# Patient Record
Sex: MALE | Race: WHITE | Employment: FULL TIME | URBAN - NONMETROPOLITAN AREA
[De-identification: names, ages, dates, MRNs, and addresses within clinical notes are randomized per-mention and may not be internally consistent; named-entity substitution may affect disease eponyms.]

---
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
## 2021-01-25 ENCOUNTER — OFFICE VISIT (OUTPATIENT)
Dept: OPTOMETRY | Age: 21
End: 2021-01-25
Payer: COMMERCIAL

## 2021-01-25 DIAGNOSIS — H40.1131 CHRONIC OPEN ANGLE GLAUCOMA OF BOTH EYES, MILD STAGE: Primary | ICD-10-CM

## 2021-01-25 PROCEDURE — 99213 OFFICE O/P EST LOW 20 MIN: CPT | Performed by: OPTOMETRIST

## 2021-01-25 ASSESSMENT — TONOMETRY
IOP_METHOD: TONOPEN
OD_IOP_MMHG: 25
OS_IOP_MMHG: 23

## 2021-01-25 ASSESSMENT — ENCOUNTER SYMPTOMS
EYES NEGATIVE: 0
RESPIRATORY NEGATIVE: 0
GASTROINTESTINAL NEGATIVE: 0
ALLERGIC/IMMUNOLOGIC NEGATIVE: 0

## 2021-01-25 ASSESSMENT — VISUAL ACUITY
OD_CC+: -2
METHOD: SNELLEN - LINEAR
OS_CC: 20/20
CORRECTION_TYPE: GLASSES
OS_CC+: -2

## 2021-01-25 ASSESSMENT — SLIT LAMP EXAM - LIDS
COMMENTS: NORMAL
COMMENTS: NORMAL

## 2021-01-25 ASSESSMENT — PACHYMETRY
OD_CT(UM): 565
OS_CT(UM): 570

## 2021-01-25 NOTE — PATIENT INSTRUCTIONS
Continue Travaprost at night in both eyes every night.     We will continue to watch the pressures and do repeat studies to make sure there is no progression 1875

## 2021-01-25 NOTE — PROGRESS NOTES
Petty Foote presents today for   Chief Complaint   Patient presents with    1 Month Follow-Up   . HPI     1 month iop check   Travaprost ou nightly          Current Outpatient Medications   Medication Sig Dispense Refill    Travoprost, KEMAR Free, (TRAVATAN Z) 0.004 % SOLN ophthalmic solution Place 1 drop into both eyes nightly 1 Bottle 3     No current facility-administered medications for this visit.           Family History   Problem Relation Age of Onset    High Blood Pressure Mother     High Cholesterol Mother     Allergies Father     Heart Disease Paternal Grandfather     Heart Disease Maternal Grandmother     Hypertension Maternal Grandmother     Cervical Cancer Maternal Grandmother     High Cholesterol Maternal Grandmother     Glaucoma Neg Hx     Diabetes Neg Hx     Cataracts Neg Hx      Social History     Socioeconomic History    Marital status: Single     Spouse name: None    Number of children: None    Years of education: None    Highest education level: None   Occupational History    None   Social Needs    Financial resource strain: None    Food insecurity     Worry: None     Inability: None    Transportation needs     Medical: None     Non-medical: None   Tobacco Use    Smoking status: Passive Smoke Exposure - Never Smoker    Smokeless tobacco: Never Used   Substance and Sexual Activity    Alcohol use: No    Drug use: No    Sexual activity: None   Lifestyle    Physical activity     Days per week: None     Minutes per session: None    Stress: None   Relationships    Social connections     Talks on phone: None     Gets together: None     Attends Jehovah's witness service: None     Active member of club or organization: None     Attends meetings of clubs or organizations: None     Relationship status: None    Intimate partner violence     Fear of current or ex partner: None     Emotionally abused: None     Physically abused: None     Forced sexual activity: None Other Topics Concern    None   Social History Narrative    None     Past Medical History:   Diagnosis Date    Aortic stenosis     congenital     Myopia with astigmatism     Seasonal allergies        ROS     Negative for: Constitutional, Gastrointestinal, Neurological, Skin, Genitourinary, Musculoskeletal, HENT, Endocrine, Cardiovascular, Eyes, Respiratory, Psychiatric, Allergic/Imm, Heme/Lymph          Main Ophthalmology Exam     External Exam       Right Left    External Normal Normal          Slit Lamp Exam       Right Left    Lids/Lashes Normal Normal    Conjunctiva/Sclera White and quiet White and quiet    Cornea Clear Clear    Anterior Chamber 4+ VH  4+ VH     Iris Round and reactive Round and reactive    Lens Clear Clear    Vitreous Normal Normal                   Tonometry     Tonometry (Tonopen, 10:42 AM)       Right Left    Pressure 25 23               Not recorded         Not recorded          Visual Acuity (Snellen - Linear)       Right Left    Dist cc 20/20 -2 20/20 -2    Correction: Glasses          Pupils     Pupils       Pupils    Right PERRL    Left PERRL              Neuro/Psych     Neuro/Psych     Oriented x3: Yes    Mood/Affect: Normal               Not recorded             Not recorded         Not recorded               1. Chronic open angle glaucoma of both eyes, mild stage           Patient Instructions   Continue Travaprost at night in both eyes every night. We will continue to watch the pressures and do repeat studies to make sure there is no progression      Return in about 3 months (around 4/25/2021) for iop check (tonopen and nct) .

## 2021-01-27 ENCOUNTER — TELEPHONE (OUTPATIENT)
Dept: OPTOMETRY | Age: 21
End: 2021-01-27

## 2021-01-27 NOTE — TELEPHONE ENCOUNTER
Patient needs a different medication called in to [facility] Pharmacy stated they did not have any Travoprost in stock. Travoprost, KEMAR Free, (TRAVATAN Z) 0.004 % SOLN ophthalmic solution [[id number]]     Order Details  Dose: 1 drop Route:  Both Eyes Frequency: NIGHTLY   Dispense Quantity: 1 Bottle Refills: 3          Sig: Place 1 drop into both eyes nightly

## 2021-04-19 ENCOUNTER — OFFICE VISIT (OUTPATIENT)
Dept: OPTOMETRY | Age: 21
End: 2021-04-19
Payer: COMMERCIAL

## 2021-04-19 DIAGNOSIS — H40.1131 PRIMARY OPEN ANGLE GLAUCOMA (POAG) OF BOTH EYES, MILD STAGE: Primary | ICD-10-CM

## 2021-04-19 PROCEDURE — 92133 CPTRZD OPH DX IMG PST SGM ON: CPT | Performed by: OPTOMETRIST

## 2021-04-19 PROCEDURE — 99213 OFFICE O/P EST LOW 20 MIN: CPT | Performed by: OPTOMETRIST

## 2021-04-19 ASSESSMENT — VISUAL ACUITY
METHOD: SNELLEN - LINEAR
OS_CC: 20/25
CORRECTION_TYPE: GLASSES

## 2021-04-19 ASSESSMENT — REFRACTION_WEARINGRX
OS_AXIS: 160
OS_CYLINDER: -0.50
SPECS_TYPE: SVL
OD_CYLINDER: -0.50
OD_SPHERE: -2.50
OD_AXIS: 117
OS_SPHERE: -2.00

## 2021-04-19 ASSESSMENT — PACHYMETRY
OS_CT(UM): 570
OD_CT(UM): 565

## 2021-04-19 ASSESSMENT — ENCOUNTER SYMPTOMS
RESPIRATORY NEGATIVE: 0
ALLERGIC/IMMUNOLOGIC NEGATIVE: 0
GASTROINTESTINAL NEGATIVE: 0
EYES NEGATIVE: 0

## 2021-04-19 ASSESSMENT — TONOMETRY
OS_IOP_MMHG: 22
IOP_METHOD: TONOPEN
OD_IOP_MMHG: 20

## 2021-04-19 ASSESSMENT — SLIT LAMP EXAM - LIDS
COMMENTS: NORMAL
COMMENTS: NORMAL

## 2021-04-19 ASSESSMENT — CONF VISUAL FIELD: METHOD: COUNTING FINGERS

## 2021-04-19 NOTE — PATIENT INSTRUCTIONS
Continue lumigan at night every night     Make sure to use the drop every night    We will retest in 6 months to make sure of no progression   And that this pressure is low enough.

## 2021-04-19 NOTE — PROGRESS NOTES
Princess Leal presents today for   Chief Complaint   Patient presents with    3 Month Follow-Up    Glaucoma   . HPI     Glaucoma     In both eyes. Comments     3 month follow up - glaucoma   Medication as changed to lumigan because of cost of travatan                Current Outpatient Medications   Medication Sig Dispense Refill    bimatoprost (LUMIGAN) 0.01 % SOLN ophthalmic drops Place 1 drop into both eyes nightly 1 Bottle 5     No current facility-administered medications for this visit.           Family History   Problem Relation Age of Onset    High Blood Pressure Mother     High Cholesterol Mother     Allergies Father     Heart Disease Paternal Grandfather     Heart Disease Maternal Grandmother     Hypertension Maternal Grandmother     Cervical Cancer Maternal Grandmother     High Cholesterol Maternal Grandmother     Glaucoma Neg Hx     Diabetes Neg Hx     Cataracts Neg Hx      Social History     Socioeconomic History    Marital status: Single     Spouse name: None    Number of children: None    Years of education: None    Highest education level: None   Occupational History    None   Social Needs    Financial resource strain: None    Food insecurity     Worry: None     Inability: None    Transportation needs     Medical: None     Non-medical: None   Tobacco Use    Smoking status: Passive Smoke Exposure - Never Smoker    Smokeless tobacco: Never Used   Substance and Sexual Activity    Alcohol use: No    Drug use: No    Sexual activity: None   Lifestyle    Physical activity     Days per week: None     Minutes per session: None    Stress: None   Relationships    Social connections     Talks on phone: None     Gets together: None     Attends Yarsani service: None     Active member of club or organization: None     Attends meetings of clubs or organizations: None     Relationship status: None    Intimate partner violence     Fear of current or ex partner: None Emotionally abused: None     Physically abused: None     Forced sexual activity: None   Other Topics Concern    None   Social History Narrative    None     Past Medical History:   Diagnosis Date    Aortic stenosis     congenital     Myopia with astigmatism     Seasonal allergies        ROS     Negative for: Constitutional, Gastrointestinal, Neurological, Skin, Genitourinary, Musculoskeletal, HENT, Endocrine, Cardiovascular, Eyes, Respiratory, Psychiatric, Allergic/Imm, Heme/Lymph          Main Ophthalmology Exam     External Exam       Right Left    External Normal Normal          Slit Lamp Exam       Right Left    Lids/Lashes Normal Normal    Conjunctiva/Sclera White and quiet White and quiet    Cornea Clear Clear    Anterior Chamber 4+ VH  4+ VH     Iris Round and reactive Round and reactive    Lens Clear Clear    Vitreous Normal Normal          Fundus Exam       Right Left    Disc Normal Normal    C/D Ratio 0.2 0.2    Macula Normal Normal    Vessels Normal Normal                   Tonometry     Tonometry (Tonopen, 10:41 AM)       Right Left    Pressure 20 22               Not recorded        Confrontational Visual Fields     Visual Fields     Counting fingers                Visual Acuity (Snellen - Linear)       Right Left    Dist cc 20/30 20/25    Correction: Glasses          Pupils     Pupils       Pupils    Right PERRL    Left PERRL              Neuro/Psych     Neuro/Psych     Oriented x3: Yes    Mood/Affect: Normal               Not recorded            Ophthalmology Exam     Wearing Rx       Sphere Cylinder Axis    Right -2.50 -0.50 117    Left -2.00 -0.50 160    Age: 3yrs    Type: SVL               Not recorded           Oct performed today and results discussed     IMPRESSION:  1. Primary open angle glaucoma (POAG) of both eyes, mild stage        PLAN:    1.  Continue treatment with lumigan at night       Patient Instructions   Continue lumigan at night every night     Make sure to use the drop every night    We will retest in 6 months to make sure of no progression   And that this pressure is low enough. Return in about 6 months (around 10/19/2021) for VF, oct ONH, then 1 week complete with dilation .

## 2021-05-18 ENCOUNTER — OFFICE VISIT (OUTPATIENT)
Dept: PRIMARY CARE CLINIC | Age: 21
End: 2021-05-18
Payer: COMMERCIAL

## 2021-05-18 VITALS
HEART RATE: 98 BPM | BODY MASS INDEX: 34.05 KG/M2 | TEMPERATURE: 98.1 F | DIASTOLIC BLOOD PRESSURE: 74 MMHG | SYSTOLIC BLOOD PRESSURE: 128 MMHG | OXYGEN SATURATION: 98 % | WEIGHT: 237.3 LBS

## 2021-05-18 DIAGNOSIS — G43.009 MIGRAINE WITHOUT AURA AND WITHOUT STATUS MIGRAINOSUS, NOT INTRACTABLE: Primary | ICD-10-CM

## 2021-05-18 DIAGNOSIS — M26.621 ARTHRALGIA OF RIGHT TEMPOROMANDIBULAR JOINT: ICD-10-CM

## 2021-05-18 PROCEDURE — 99213 OFFICE O/P EST LOW 20 MIN: CPT | Performed by: FAMILY MEDICINE

## 2021-05-18 ASSESSMENT — ENCOUNTER SYMPTOMS
EYE PAIN: 1
SINUS PAIN: 0
VOMITING: 0
SORE THROAT: 0
SINUS PRESSURE: 0
RHINORRHEA: 0
NAUSEA: 0
PHOTOPHOBIA: 1

## 2021-05-18 ASSESSMENT — PATIENT HEALTH QUESTIONNAIRE - PHQ9
SUM OF ALL RESPONSES TO PHQ QUESTIONS 1-9: 0
SUM OF ALL RESPONSES TO PHQ QUESTIONS 1-9: 0
2. FEELING DOWN, DEPRESSED OR HOPELESS: 0
1. LITTLE INTEREST OR PLEASURE IN DOING THINGS: 0

## 2021-05-18 NOTE — PROGRESS NOTES
2021     Lety Hoyt (:  2000) is a 22 y.o. male, here for evaluation of the following medical concerns:    Migraine   This is a new problem. The current episode started today. The problem occurs constantly. The problem has been gradually improving. The pain is located in the left unilateral region. The pain quality is similar to prior headaches. Associated symptoms include dizziness, ear pain, eye pain, phonophobia and photophobia. Pertinent negatives include no fever, nausea, numbness, rhinorrhea, sinus pressure, sore throat, tingling or vomiting. Associated symptoms comments: Did state that during this episode he did open his jaw and his right TMJ cracked and he had sudden pain in his right ear. Headache was present prior to this episode. Excell Delilah He has tried NSAIDs for the symptoms. The treatment provided moderate (since taking the ibuprofen, his headache has significantly improved.) relief. His past medical history is significant for migraine headaches. Did review patient's med list, allergies, social history,pmhx and pshx today as noted in the record. Review of Systems   Constitutional: Negative for chills, fatigue and fever. HENT: Positive for ear pain. Negative for congestion, postnasal drip, rhinorrhea, sinus pressure, sinus pain and sore throat. Eyes: Positive for photophobia and pain. Gastrointestinal: Negative for nausea and vomiting. Neurological: Positive for dizziness. Negative for tingling and numbness. Prior to Visit Medications    Medication Sig Taking?  Authorizing Provider   bimatoprost (LUMIGAN) 0.01 % SOLN ophthalmic drops Place 1 drop into both eyes nightly Yes Eufemia Robledo OD        Social History     Tobacco Use    Smoking status: Passive Smoke Exposure - Never Smoker    Smokeless tobacco: Never Used   Substance Use Topics    Alcohol use: No        Vitals:    21 1822   BP: 128/74   Site: Left Upper Arm   Position: Sitting   Cuff Size: Large Adult   Pulse: 98   Temp: 98.1 °F (36.7 °C)   TempSrc: Tympanic   SpO2: 98%   Weight: 237 lb 4.8 oz (107.6 kg)     Estimated body mass index is 34.05 kg/m² as calculated from the following:    Height as of 7/6/20: 5' 10\" (1.778 m). Weight as of this encounter: 237 lb 4.8 oz (107.6 kg). Physical Exam  Vitals and nursing note reviewed. Constitutional:       General: He is not in acute distress. Appearance: Normal appearance. He is well-developed. He is not diaphoretic. HENT:      Head: Normocephalic and atraumatic. Right Ear: External ear normal.      Left Ear: External ear normal.      Ears:      Comments: TMs dull with fluid behind the TM     Nose: No congestion or rhinorrhea. Mouth/Throat:      Pharynx: No posterior oropharyngeal erythema. Comments: Post nasal drainage noted  Eyes:      General: No scleral icterus. Right eye: No discharge. Left eye: No discharge. Conjunctiva/sclera: Conjunctivae normal.      Pupils: Pupils are equal, round, and reactive to light. Neck:      Thyroid: No thyromegaly. Cardiovascular:      Rate and Rhythm: Normal rate and regular rhythm. Heart sounds: Normal heart sounds. Pulmonary:      Effort: Pulmonary effort is normal. No respiratory distress. Breath sounds: Normal breath sounds. No wheezing. Musculoskeletal:      Cervical back: Normal range of motion and neck supple. Lymphadenopathy:      Cervical: No cervical adenopathy. Skin:     General: Skin is warm and dry. Findings: No rash. Neurological:      General: No focal deficit present. Mental Status: He is alert and oriented to person, place, and time. Cranial Nerves: No cranial nerve deficit. Psychiatric:         Behavior: Behavior normal.         Thought Content: Thought content normal.         Judgment: Judgment normal.         ASSESSMENT/PLAN:  Encounter Diagnoses   Name Primary?     Migraine without aura and without status migrainosus, not intractable Yes    Arthralgia of right temporomandibular joint        At this point his headache has improved. Reassurance is given. Would monitor for recurrent headaches. If he has frequent recurrence, then would plan to consider prophylactic therapy. Would take ibuprofen as needed for acute headaches. Patient did likely have malpositioning of his mandible which caused the TMJ to crack. Would just continue with NSAIDs to help with residual inflammation and pain. Return  if no improvement in symptoms or if any further symptoms arise. No follow-ups on file. An electronic signature was used to authenticate this note.     --Екатерина Orlando,  on 5/18/2021 at 6:28 PM

## 2021-05-18 NOTE — LETTER
[address] [facility] department of [place] [address]  Phone: [phone]  Fax: [address],       May 18, 2021    Patient:   Santiago Lowry  Date of Birth   2000  Date of visit   5/18/2021        To Whom it May Concern:      Osvaldo Chen was seen in my clinic on 5/18/2021. Please excuse from work 5/18/21 due to acute medical issues. If you have any questions or concerns, please don't hesitate to call.       Sincerely,      Miriam Perkins, DO

## 2021-05-21 ENCOUNTER — TELEPHONE (OUTPATIENT)
Dept: FAMILY MEDICINE CLINIC | Age: 21
End: 2021-05-21

## 2021-05-21 NOTE — TELEPHONE ENCOUNTER
Patient still experiencing headache that you seen him for 5/18/2021. Not as bad but still lingering. You had mentioned something about TMJ to him and mom states he has always had an issue with cracking/popping of the jaw and teeth grinding and wonders what kind of specialist he could see for this. She is aware we will be back in touch on Monday.

## 2021-05-24 NOTE — TELEPHONE ENCOUNTER
Typically I recommend seeing his dentist initially and they sometimes will make a bite guard to help with jaw positioning.

## 2021-06-22 RX ORDER — TRAVOPROST OPHTHALMIC SOLUTION 0.04 MG/ML
1 SOLUTION OPHTHALMIC NIGHTLY
Qty: 3 BOTTLE | Refills: 3 | Status: SHIPPED | OUTPATIENT
Start: 2021-06-22 | End: 2022-06-27

## 2021-06-22 RX ORDER — TRAVOPROST OPHTHALMIC SOLUTION 0.04 MG/ML
SOLUTION OPHTHALMIC
COMMUNITY
Start: 2021-05-15 | End: 2021-06-22 | Stop reason: SDUPTHER

## 2021-10-18 ENCOUNTER — PROCEDURE VISIT (OUTPATIENT)
Dept: OPTOMETRY | Age: 21
End: 2021-10-18
Payer: COMMERCIAL

## 2021-10-18 DIAGNOSIS — H40.1131 CHRONIC OPEN ANGLE GLAUCOMA OF BOTH EYES, MILD STAGE: Primary | ICD-10-CM

## 2021-10-18 PROCEDURE — 92133 CPTRZD OPH DX IMG PST SGM ON: CPT | Performed by: OPTOMETRIST

## 2021-10-18 PROCEDURE — 99999 PR OFFICE/OUTPT VISIT,PROCEDURE ONLY: CPT | Performed by: OPTOMETRIST

## 2021-10-18 PROCEDURE — 92083 EXTENDED VISUAL FIELD XM: CPT | Performed by: OPTOMETRIST

## 2021-10-18 NOTE — PROGRESS NOTES
[address] [place] [address]  Dept: [phone]  Dept Fax: [phone]        [facility] 2000  is here today for HVF 24-2 and OCT ON     Last HVF was completed: 10/15/20  Last OCT was completed: 4/19/21  Last visit: 4/19/21    Patient is scheduled to follow up on 11/01/2021    Patient is currently taking the following eyedrops: Lumigan ou nightly, was switched from travaprost due to insurance coverage. Orders Placed This Encounter   Procedures    Posterior Segment Optic Nerve OCT - OU - Both Eyes    GILL VISUAL FIELD - OU - BOTH EYES         Diagnosis Orders   1. Chronic open angle glaucoma of both eyes, mild stage  Posterior Segment Optic Nerve OCT - OU - Both Eyes    GILL VISUAL FIELD - OU - BOTH EYES        Current Outpatient Medications   Medication Sig Dispense Refill    Travoprost, KEMAR Free, (TRAVATAN Z) 0.004 % SOLN ophthalmic solution Place 1 drop into both eyes nightly 3 Bottle 3     No current facility-administered medications for this visit.

## 2021-10-19 ENCOUNTER — OFFICE VISIT (OUTPATIENT)
Dept: PRIMARY CARE CLINIC | Age: 21
End: 2021-10-19
Payer: COMMERCIAL

## 2021-10-19 VITALS
RESPIRATION RATE: 18 BRPM | TEMPERATURE: 98.3 F | WEIGHT: 241.6 LBS | OXYGEN SATURATION: 100 % | HEIGHT: 70 IN | BODY MASS INDEX: 34.59 KG/M2 | DIASTOLIC BLOOD PRESSURE: 72 MMHG | SYSTOLIC BLOOD PRESSURE: 112 MMHG | HEART RATE: 100 BPM

## 2021-10-19 DIAGNOSIS — M79.10 MYALGIA: ICD-10-CM

## 2021-10-19 DIAGNOSIS — R05.9 COUGH: ICD-10-CM

## 2021-10-19 DIAGNOSIS — J06.9 VIRAL UPPER RESPIRATORY TRACT INFECTION: Primary | ICD-10-CM

## 2021-10-19 DIAGNOSIS — R51.9 NONINTRACTABLE HEADACHE, UNSPECIFIED CHRONICITY PATTERN, UNSPECIFIED HEADACHE TYPE: ICD-10-CM

## 2021-10-19 DIAGNOSIS — R09.81 CONGESTION OF NASAL SINUS: ICD-10-CM

## 2021-10-19 DIAGNOSIS — Z20.822 PERSON UNDER INVESTIGATION FOR COVID-19: ICD-10-CM

## 2021-10-19 DIAGNOSIS — R53.83 FATIGUE, UNSPECIFIED TYPE: ICD-10-CM

## 2021-10-19 PROCEDURE — 99213 OFFICE O/P EST LOW 20 MIN: CPT | Performed by: NURSE PRACTITIONER

## 2021-10-19 ASSESSMENT — ENCOUNTER SYMPTOMS
SORE THROAT: 1
SHORTNESS OF BREATH: 0
NAUSEA: 0
VOMITING: 0
SINUS COMPLAINT: 1
COUGH: 1
DIARRHEA: 0
WHEEZING: 0

## 2021-10-19 NOTE — PATIENT INSTRUCTIONS
Return Wednesday evening/Tursday morning to complete covid test.Will notify you of covid test result as soon as available. You should isolate at home in an area away from family. If you must be around family members, please wear a mask. Quarantine at home until result is available. This means do not go to work/school, attend family gatherings, or invite others to your home until you know your test results. A work/school note will be provided for you. Symptoms appear viral; no antibiotic warranted at this time. The following are measures you can try at home to help provide symptom relief:    --Increase your water intake to help thin secretions and maintain hydration. --May try mucinex (guaifenesin) to help with congestion and robitussin (dextromethorphan) for persistent cough. Use cool mist humidifier at bedtime to moisturize air. Use nasal saline rinse as needed for congestion. --Tylenol or ibuprofen for fever/body aches/headache. Warm/cold packs to forehead and back of neck can help with headache pain. Warm baths/showers can sooth body aches also. Practice good hand hygiene and cover your cough and sneeze to prevent passing virus on. If symptoms worsen or fail to improve, please return to clinic. If you develop severe worsening of symptoms such as chest pain or difficulty breathing, please go to ER. Patient Education        Upper Respiratory Infection (Cold): Care Instructions  Your Care Instructions     An upper respiratory infection, or URI, is an infection of the nose, sinuses, or throat. URIs are spread by coughs, sneezes, and direct contact. The common cold is the most frequent kind of URI. The flu and sinus infections are other kinds of URIs. Almost all URIs are caused by viruses. Antibiotics won't cure them. But you can treat most infections with home care. This may include drinking lots of fluids and taking over-the-counter pain medicine.  You will probably feel better in 4 to 10 days.  The doctor has checked you carefully, but problems can develop later. If you notice any problems or new symptoms, get medical treatment right away. Follow-up care is a key part of your treatment and safety. Be sure to make and go to all appointments, and call your doctor if you are having problems. It's also a good idea to know your test results and keep a list of the medicines you take. How can you care for yourself at home? · To prevent dehydration, drink plenty of fluids. Choose water and other clear liquids until you feel better. If you have kidney, heart, or liver disease and have to limit fluids, talk with your doctor before you increase the amount of fluids you drink. · Take an over-the-counter pain medicine, such as acetaminophen (Tylenol), ibuprofen (Advil, Motrin), or naproxen (Aleve). Read and follow all instructions on the label. · Before you use cough and cold medicines, check the label. These medicines may not be safe for young children or for people with certain health problems. · Be careful when taking over-the-counter cold or flu medicines and Tylenol at the same time. Many of these medicines have acetaminophen, which is Tylenol. Read the labels to make sure that you are not taking more than the recommended dose. Too much acetaminophen (Tylenol) can be harmful. · Get plenty of rest.  · Do not smoke or allow others to smoke around you. If you need help quitting, talk to your doctor about stop-smoking programs and medicines. These can increase your chances of quitting for good. When should you call for help? Call 911 anytime you think you may need emergency care. For example, call if:    · You have severe trouble breathing. Call your doctor now or seek immediate medical care if:    · You seem to be getting much sicker.     · You have new or worse trouble breathing.     · You have a new or higher fever.     · You have a new rash.    Watch closely for changes in your health, and be sure to contact your doctor if:    · You have a new symptom, such as a sore throat, an earache, or sinus pain.     · You cough more deeply or more often, especially if you notice more mucus or a change in the color of your mucus.     · You do not get better as expected. Where can you learn more? Go to https://chpeleslieweb.[email or web address]. org and sign in to your [email or web address] account. Enter J546 in the Freedom2 box to learn more about \"Upper Respiratory Infection (Cold): Care Instructions. \"     If you do not have an account, please click on the \"Sign Up Now\" link. Current as of: July 6, 2021               Content Version: 13.0  © 2006-2021 [employer]. Care instructions adapted under license by [facility] ([facility]). If you have questions about a medical condition or this instruction, always ask your healthcare professional. [address] any warranty or liability for your use of this information.

## 2021-10-19 NOTE — PROGRESS NOTES
[address] [place] [address]  Dept: [phone]  Dept Fax: [phone]  Loc: [phone]        CHIEF COMPLAINT       Chief Complaint   Patient presents with    Chest Congestion     cough, sorethroat        Nurses Notes reviewed and I agree except as noted in the HPI. HISTORY OF PRESENT ILLNESS   Daniel Bajwa is a 22 y.o. male who presents to [facility] today (10/20/2021) for evaluation of:   Sinus Problem  This is a new problem. The current episode started yesterday (last night). The problem has been gradually worsening since onset. There has been no fever. Associated symptoms include congestion, coughing, headaches and a sore throat. Pertinent negatives include no shortness of breath. Treatments tried: dayquil. The treatment provided mild relief. Friends wife was ill, was around her this past weekend. REVIEW OF SYSTEMS     Review of Systems   Constitutional: Positive for fatigue. Negative for fever. HENT: Positive for congestion, postnasal drip and sore throat. Respiratory: Positive for cough. Negative for shortness of breath and wheezing. Gastrointestinal: Negative for diarrhea, nausea and vomiting. Musculoskeletal: Positive for myalgias. Neurological: Positive for headaches. PAST MEDICAL HISTORY         Diagnosis Date    Aortic stenosis     congenital     Myopia with astigmatism     Seasonal allergies        SURGICAL HISTORY     Patient  has a past surgical history that includes Abdomen surgery (2001). CURRENT MEDICATIONS       Outpatient Medications Prior to Visit   Medication Sig Dispense Refill    Travoprost, KEMAR Free, (TRAVATAN Z) 0.004 % SOLN ophthalmic solution Place 1 drop into both eyes nightly 3 Bottle 3     No facility-administered medications prior to visit. ALLERGIES     Patient is has No Known Allergies.     FAMILY HISTORY Patient's family history includes Allergies in his father; Cervical Cancer in his maternal grandmother; Heart Disease in his maternal grandmother and paternal grandfather; High Blood Pressure in his mother; High Cholesterol in his maternal grandmother and mother; Hypertension in his maternal grandmother. SOCIAL HISTORY     Patient  reports that he is a non-smoker but has been exposed to tobacco smoke. He has never used smokeless tobacco. He reports that he does not drink alcohol and does not use drugs. PHYSICAL EXAM     VITALS  BP: 112/72, Temp: 98.3 °F (36.8 °C), Pulse: 100, Resp: 18, SpO2: 100 %  Physical Exam  Vitals reviewed. Constitutional:       General: He is not in acute distress. Appearance: He is not ill-appearing. HENT:      Right Ear: Tympanic membrane and ear canal normal.      Left Ear: Tympanic membrane and ear canal normal.      Nose: Congestion and rhinorrhea present. Rhinorrhea is clear. Mouth/Throat:      Lips: Pink. Mouth: Mucous membranes are moist.      Pharynx: Oropharynx is clear. No oropharyngeal exudate or posterior oropharyngeal erythema. Tonsils: No tonsillar exudate. Cardiovascular:      Rate and Rhythm: Normal rate and regular rhythm. Heart sounds: Normal heart sounds, S1 normal and S2 normal. No murmur heard. Pulmonary:      Effort: Pulmonary effort is normal. No accessory muscle usage or respiratory distress. Breath sounds: Normal breath sounds. No wheezing or rhonchi. Musculoskeletal:      Cervical back: Normal range of motion and neck supple. Lymphadenopathy:      Cervical: No cervical adenopathy. Skin:     General: Skin is warm and dry. Capillary Refill: Capillary refill takes less than 2 seconds. Neurological:      General: No focal deficit present. Mental Status: He is alert. DIAGNOSTIC RESULTS   Labs:No results found for this visit on 10/19/21.     IMAGING:        CLINICAL COURSE:     Vitals:    10/19/21 1512   BP: 112/72   Pulse: 100   Resp: 18   Temp: 98.3 °F (36.8 °C)   SpO2: 100%   Weight: 241 lb 9.6 oz (109.6 kg)   Height: 5' 10\" (1.778 m)           PROCEDURES:  None  FINAL IMPRESSION      1. Viral upper respiratory tract infection    2. Cough    3. Congestion of nasal sinus    4. Fatigue, unspecified type    5. Nonintractable headache, unspecified chronicity pattern, unspecified headache type    6. Myalgia    7. Person under investigation for COVID-19         DISPOSITION/PLAN   Symptoms appear viral at this time. Covid test ordered for pt to complete Wednesday or Thursday as pt's symptoms only began last night. Quarantine guidelines reviewed; will notify as soon as results are available. Discussed supportive measures for symptom relief and educated pt on s/s that warrant return to clinic. Work note provided. Patient Instructions     Return Wednesday evening/Tursday morning to complete covid test.Will notify you of covid test result as soon as available. You should isolate at home in an area away from family. If you must be around family members, please wear a mask. Quarantine at home until result is available. This means do not go to work/school, attend family gatherings, or invite others to your home until you know your test results. A work/school note will be provided for you. Symptoms appear viral; no antibiotic warranted at this time. The following are measures you can try at home to help provide symptom relief:    --Increase your water intake to help thin secretions and maintain hydration. --May try mucinex (guaifenesin) to help with congestion and robitussin (dextromethorphan) for persistent cough. Use cool mist humidifier at bedtime to moisturize air. Use nasal saline rinse as needed for congestion. --Tylenol or ibuprofen for fever/body aches/headache. Warm/cold packs to forehead and back of neck can help with headache pain. Warm baths/showers can sooth body aches also. Practice good hand hygiene and cover your cough and sneeze to prevent passing virus on. If symptoms worsen or fail to improve, please return to clinic. If you develop severe worsening of symptoms such as chest pain or difficulty breathing, please go to ER. Patient Education        Upper Respiratory Infection (Cold): Care Instructions  Your Care Instructions     An upper respiratory infection, or URI, is an infection of the nose, sinuses, or throat. URIs are spread by coughs, sneezes, and direct contact. The common cold is the most frequent kind of URI. The flu and sinus infections are other kinds of URIs. Almost all URIs are caused by viruses. Antibiotics won't cure them. But you can treat most infections with home care. This may include drinking lots of fluids and taking over-the-counter pain medicine. You will probably feel better in 4 to 10 days. The doctor has checked you carefully, but problems can develop later. If you notice any problems or new symptoms, get medical treatment right away. Follow-up care is a key part of your treatment and safety. Be sure to make and go to all appointments, and call your doctor if you are having problems. It's also a good idea to know your test results and keep a list of the medicines you take. How can you care for yourself at home? · To prevent dehydration, drink plenty of fluids. Choose water and other clear liquids until you feel better. If you have kidney, heart, or liver disease and have to limit fluids, talk with your doctor before you increase the amount of fluids you drink. · Take an over-the-counter pain medicine, such as acetaminophen (Tylenol), ibuprofen (Advil, Motrin), or naproxen (Aleve). Read and follow all instructions on the label. · Before you use cough and cold medicines, check the label. These medicines may not be safe for young children or for people with certain health problems.   · Be careful when taking over-the-counter cold or flu medicines and Tylenol at the same time. Many of these medicines have acetaminophen, which is Tylenol. Read the labels to make sure that you are not taking more than the recommended dose. Too much acetaminophen (Tylenol) can be harmful. · Get plenty of rest.  · Do not smoke or allow others to smoke around you. If you need help quitting, talk to your doctor about stop-smoking programs and medicines. These can increase your chances of quitting for good. When should you call for help? Call 911 anytime you think you may need emergency care. For example, call if:    · You have severe trouble breathing. Call your doctor now or seek immediate medical care if:    · You seem to be getting much sicker.     · You have new or worse trouble breathing.     · You have a new or higher fever.     · You have a new rash. Watch closely for changes in your health, and be sure to contact your doctor if:    · You have a new symptom, such as a sore throat, an earache, or sinus pain.     · You cough more deeply or more often, especially if you notice more mucus or a change in the color of your mucus.     · You do not get better as expected. Where can you learn more? Go to https://[employer]. org and sign in to your [employer] account. Enter S057 in the [facility] box to learn more about \"Upper Respiratory Infection (Cold): Care Instructions. \"     If you do not have an account, please click on the \"Sign Up Now\" link. Current as of: July 6, 2021               Content Version: 13.0  © 2006-2021 Healthwise, Incorporated. Care instructions adapted under license by [facility] If you have questions about a medical condition or this instruction, always ask your healthcare professional. [place] [address] any warranty or liability for your use of this information.                    The use, risks, benefits, and potential side effects of prescribed and/or recommended medications were discussed. All questions were answered and the patient/caregiver voiced understanding. Orders Placed This Encounter   Procedures    COVID-19     Standing Status:   Future     Standing Expiration Date:   10/19/2022     Scheduling Instructions:      1) Due to current limited availability of the COVID-19 test, tests will be prioritized based on responses to questions above. Testing may be delayed due to volume. 2) Print and instruct patient to adhere to CDC home isolation program. (Link Above)              3) Set up or refer patient for a monitoring program.              4) Have patient sign up for and leverage MyChart (if not previously done). Order Specific Question:   Is this test for diagnosis or screening? Answer:   Diagnosis of ill patient     Order Specific Question:   Symptomatic for COVID-19 as defined by CDC? Answer:   Yes     Order Specific Question:   Date of Symptom Onset     Answer:   10/18/2021     Order Specific Question:   Hospitalized for COVID-19? Answer:   No     Order Specific Question:   Admitted to ICU for COVID-19? Answer:   No     Order Specific Question:   Employed in healthcare setting? Answer:   No     Order Specific Question:   Resident in a congregate (group) care setting? Answer:   No     Order Specific Question:   Pregnant: Answer:   No     Order Specific Question:   Previously tested for COVID-19? Answer:   No     Outpatient Encounter Medications as of 10/19/2021   Medication Sig Dispense Refill    Travoprost, KEMAR Free, (TRAVATAN Z) 0.004 % SOLN ophthalmic solution Place 1 drop into both eyes nightly 3 Bottle 3     No facility-administered encounter medications on file as of 10/19/2021. No follow-ups on file.                 Electronically signed by RUDY Camacho CNP on 10/20/2021 at 12:14 PM

## 2021-10-19 NOTE — LETTER
[facility] A department of [facility]  Phone: [phone]  Fax: [phone]    RUDY Chance CNP        October 19, 2021     Patient: Mk Omalley   YOB: 2000   Date of Visit: 10/19/2021       To Whom it May Concern:    Elizabeth Flores was seen in my clinic on 10/19/2021. He may return to work after a negative covid test result (results expected in appx 1-3 days) and marked symptom improvement. Pt should be fever free for 24 hours without medication. If you have any questions or concerns, please don't hesitate to call.     Sincerely,         RUDY Chance CNP

## 2021-10-21 ENCOUNTER — HOSPITAL ENCOUNTER (OUTPATIENT)
Age: 21
Setting detail: SPECIMEN
Discharge: HOME | End: 2021-10-21
Payer: COMMERCIAL

## 2021-10-21 DIAGNOSIS — Z20.822 PERSON UNDER INVESTIGATION FOR COVID-19: ICD-10-CM

## 2021-10-21 DIAGNOSIS — R05.9 COUGH: ICD-10-CM

## 2021-10-21 DIAGNOSIS — J06.9 VIRAL UPPER RESPIRATORY TRACT INFECTION: ICD-10-CM

## 2021-10-21 DIAGNOSIS — R53.83 FATIGUE, UNSPECIFIED TYPE: ICD-10-CM

## 2021-10-21 DIAGNOSIS — R09.81 CONGESTION OF NASAL SINUS: ICD-10-CM

## 2021-10-21 DIAGNOSIS — R51.9 NONINTRACTABLE HEADACHE, UNSPECIFIED CHRONICITY PATTERN, UNSPECIFIED HEADACHE TYPE: ICD-10-CM

## 2021-10-21 DIAGNOSIS — M79.10 MYALGIA: ICD-10-CM

## 2021-10-21 PROCEDURE — U0003 INFECTIOUS AGENT DETECTION BY NUCLEIC ACID (DNA OR RNA); SEVERE ACUTE RESPIRATORY SYNDROME CORONAVIRUS 2 (SARS-COV-2) (CORONAVIRUS DISEASE [COVID-19]), AMPLIFIED PROBE TECHNIQUE, MAKING USE OF HIGH THROUGHPUT TECHNOLOGIES AS DESCRIBED BY CMS-2020-01-R: HCPCS

## 2021-10-21 PROCEDURE — U0005 INFEC AGEN DETEC AMPLI PROBE: HCPCS

## 2021-10-22 LAB
SARS-COV-2: NORMAL
SARS-COV-2: NOT DETECTED
SOURCE: NORMAL

## 2021-11-01 ENCOUNTER — OFFICE VISIT (OUTPATIENT)
Dept: OPTOMETRY | Age: 21
End: 2021-11-01
Payer: COMMERCIAL

## 2021-11-01 DIAGNOSIS — H40.1131 PRIMARY OPEN ANGLE GLAUCOMA (POAG) OF BOTH EYES, MILD STAGE: Primary | ICD-10-CM

## 2021-11-01 PROCEDURE — 92250 FUNDUS PHOTOGRAPHY W/I&R: CPT | Performed by: OPTOMETRIST

## 2021-11-01 PROCEDURE — 99214 OFFICE O/P EST MOD 30 MIN: CPT | Performed by: OPTOMETRIST

## 2021-11-01 RX ORDER — TROPICAMIDE 10 MG/ML
1 SOLUTION/ DROPS OPHTHALMIC ONCE
Status: COMPLETED | OUTPATIENT
Start: 2021-11-01 | End: 2021-11-01

## 2021-11-01 RX ADMIN — TROPICAMIDE 1 DROP: 10 SOLUTION/ DROPS OPHTHALMIC at 10:55

## 2021-11-01 ASSESSMENT — SLIT LAMP EXAM - LIDS
COMMENTS: NORMAL
COMMENTS: NORMAL

## 2021-11-01 ASSESSMENT — PACHYMETRY
OS_CT(UM): 570
OD_CT(UM): 565

## 2021-11-01 ASSESSMENT — ENCOUNTER SYMPTOMS
ALLERGIC/IMMUNOLOGIC NEGATIVE: 0
EYES NEGATIVE: 0
GASTROINTESTINAL NEGATIVE: 0
RESPIRATORY NEGATIVE: 0

## 2021-11-01 ASSESSMENT — VISUAL ACUITY
METHOD: SNELLEN - LINEAR
OS_CC+: -1
OS_CC: 20/20
CORRECTION_TYPE: GLASSES

## 2021-11-01 ASSESSMENT — TONOMETRY
IOP_METHOD: TONOPEN
OD_IOP_MMHG: 15
OS_IOP_MMHG: 17

## 2021-11-01 NOTE — PROGRESS NOTES
Yudy Mendez presents today for   Chief Complaint   Patient presents with    2 Week Follow-Up   . HPI     Follow up after HVF: 10/18/2021  travoprost ou at night         Current Outpatient Medications   Medication Sig Dispense Refill    Travoprost, KEMAR Free, (TRAVATAN Z) 0.004 % SOLN ophthalmic solution Place 1 drop into both eyes nightly 3 Bottle 3     No current facility-administered medications for this visit. Family History   Problem Relation Age of Onset    High Blood Pressure Mother     High Cholesterol Mother     Allergies Father     Heart Disease Paternal Grandfather     Heart Disease Maternal Grandmother     Hypertension Maternal Grandmother     Cervical Cancer Maternal Grandmother     High Cholesterol Maternal Grandmother     Glaucoma Neg Hx     Diabetes Neg Hx     Cataracts Neg Hx      Social History     Socioeconomic History    Marital status: Single     Spouse name: None    Number of children: None    Years of education: None    Highest education level: None   Occupational History    None   Tobacco Use    Smoking status: Passive Smoke Exposure - Never Smoker    Smokeless tobacco: Never Used   Substance and Sexual Activity    Alcohol use: No    Drug use: No    Sexual activity: None   Other Topics Concern    None   Social History Narrative    None     Social Determinants of Health     Financial Resource Strain:     Difficulty of Paying Living Expenses:    Food Insecurity:     Worried About Running Out of Food in the Last Year:     Ran Out of Food in the Last Year:    Transportation Needs:     Lack of Transportation (Medical):      Lack of Transportation (Non-Medical):    Physical Activity:     Days of Exercise per Week:     Minutes of Exercise per Session:    Stress:     Feeling of Stress :    Social Connections:     Frequency of Communication with Friends and Family:     Frequency of Social Gatherings with Friends and Family:     Attends [employer] Services:  Active Member of Clubs or Organizations:     Attends Club or Organization Meetings:     Marital Status:    Intimate Partner Violence:     Fear of Current or Ex-Partner:     Emotionally Abused:     Physically Abused:     Sexually Abused:      Past Medical History:   Diagnosis Date    Aortic stenosis     congenital     Myopia with astigmatism     Seasonal allergies        ROS     Negative for: Constitutional, Gastrointestinal, Neurological, Skin, Genitourinary, Musculoskeletal, HENT, Endocrine, Cardiovascular, Eyes, Respiratory, Psychiatric, Allergic/Imm, Heme/Lymph          Main Ophthalmology Exam     External Exam       Right Left    External Normal Normal          Slit Lamp Exam       Right Left    Lids/Lashes Normal Normal    Conjunctiva/Sclera White and quiet White and quiet    Cornea Clear Clear    Anterior Chamber 4+ VH  4+ VH     Iris Round and reactive Round and reactive    Lens Clear Clear    Vitreous Normal Normal          Fundus Exam       Right Left    Disc Normal Normal    C/D Ratio 0.2 0.5    Macula Normal Normal    Vessels Normal Normal                   Tonometry     Tonometry (Tonopen, 10:45 AM)       Right Left    Pressure 15 17               Not recorded         Not recorded          Visual Acuity (Snellen - Linear)       Right Left    Dist cc 20/20 20/20 -1    Correction: Glasses          Pupils     Pupils       Pupils    Right PERRL    Left PERRL              Neuro/Psych     Neuro/Psych     Oriented x3: Yes    Mood/Affect: Normal               Not recorded             Not recorded         Not recorded               Orders Placed This Encounter   Procedures    Color Fundus Photography-OU-Both Eyes         IMPRESSION:  1. Primary open angle glaucoma (POAG) of both eyes, mild stage        PLAN:    1. Please continue travaprost  glaucoma eyedrops as prescribed. Counseled patient that the glaucomas are a group of eye diseases with the potential for severe and irreversible vision loss. Counseled patient regarding the importance of compliance with therapy and follow-up. Counseled patient regarding glaucoma therapy with topical medications and the potential side effects and adverse effects of topical medications. The patient was counseled that glaucoma is a progressive optic neuropathy that, if left untreated, may progress to irreversible blindness. The patient was counseled that the only demonstrated treatment for glaucoma is to lower the pressure in the eye to a target eye pressure which may decrease the rate at which the progressive optic neuropathy leads to irreversible blindness. Patient Instructions   Continue current drops      Return in about 6 months (around 5/1/2022) for iop .

## 2022-01-21 ENCOUNTER — VIRTUAL VISIT (OUTPATIENT)
Dept: FAMILY MEDICINE CLINIC | Age: 22
End: 2022-01-21
Payer: COMMERCIAL

## 2022-01-21 DIAGNOSIS — U07.1 COVID-19: Primary | ICD-10-CM

## 2022-01-21 PROCEDURE — 99213 OFFICE O/P EST LOW 20 MIN: CPT | Performed by: NURSE PRACTITIONER

## 2022-01-21 RX ORDER — ALBUTEROL SULFATE 90 UG/1
2 AEROSOL, METERED RESPIRATORY (INHALATION) 4 TIMES DAILY PRN
Qty: 18 G | Refills: 2 | Status: SHIPPED | OUTPATIENT
Start: 2022-01-21 | End: 2022-09-14

## 2022-01-21 RX ORDER — ACETAMINOPHEN 500 MG
500 TABLET ORAL EVERY 6 HOURS PRN
COMMUNITY

## 2022-01-21 ASSESSMENT — PATIENT HEALTH QUESTIONNAIRE - PHQ9
SUM OF ALL RESPONSES TO PHQ QUESTIONS 1-9: 0
1. LITTLE INTEREST OR PLEASURE IN DOING THINGS: 0
SUM OF ALL RESPONSES TO PHQ QUESTIONS 1-9: 0
2. FEELING DOWN, DEPRESSED OR HOPELESS: 0
SUM OF ALL RESPONSES TO PHQ QUESTIONS 1-9: 0
SUM OF ALL RESPONSES TO PHQ9 QUESTIONS 1 & 2: 0
SUM OF ALL RESPONSES TO PHQ QUESTIONS 1-9: 0

## 2022-01-21 ASSESSMENT — ENCOUNTER SYMPTOMS
SHORTNESS OF BREATH: 1
COUGH: 1

## 2022-01-21 NOTE — PROGRESS NOTES
Subjective:      Patient ID: Edmund Perkins is a 22 y.o. male coming in for   Chief Complaint   Patient presents with    Fever     tested positive for COVID two weeks ago. fever of 103 yesterday. body aches. stuffy nose.  Letter for School/Work     needs a letter for work. email: [email or web address]. [employer]   Edmund Perkins, was evaluated through a synchronous (real-time) audio-video encounter. The patient (or guardian if applicable) is aware that this is a billable service, which includes applicable co-pays. This Virtual Visit was conducted with patient's (and/or legal guardian's) consent. The visit was conducted pursuant to the emergency declaration under the [address] [place] [address] authority and the [employer] Act. Patient identification was verified, and a caregiver was present when appropriate. The patient was located in a state where the provider was licensed to provide care. HPI  Tested positive for covid two weeks ago. Tested positive on 1/9/22. Missed work from 1/10/22-1/15/22. Off work again 1/18/22 due to fever and missed work all this week. Reports still feeling short of breath and body aches. Has been taking tylenol/motrin, dayquil, nyquil, tylenol cold and flu. Review of Systems   Constitutional: Positive for fever. HENT: Positive for congestion. Respiratory: Positive for cough and shortness of breath (worse with activity). Musculoskeletal: Positive for myalgias. All other systems reviewed and are negative. Objective:   Physical Exam  Vitals and nursing note reviewed. Constitutional:       General: He is not in acute distress. Appearance: Normal appearance. Pulmonary:      Effort: Pulmonary effort is normal.   Neurological:      General: No focal deficit present. Mental Status: He is alert and oriented to person, place, and time. Assessment:      1.  COVID-19 Plan:    albuterol as needed for shortness of breath  Continue with otc supportive care  Work note provided per pt request.     No orders of the defined types were placed in this encounter. Outpatient Encounter Medications as of 1/21/2022   Medication Sig Dispense Refill    acetaminophen (TYLENOL) 500 MG tablet Take 500 mg by mouth every 6 hours as needed for Pain      albuterol sulfate HFA (VENTOLIN HFA) 108 (90 Base) MCG/ACT inhaler Inhale 2 puffs into the lungs 4 times daily as needed for Wheezing or Shortness of Breath 18 g 2    Travoprost, BAK Free, (TRAVATAN Z) 0.004 % SOLN ophthalmic solution Place 1 drop into both eyes nightly 3 Bottle 3     No facility-administered encounter medications on file as of 1/21/2022.             RUDY Mccall - CNP

## 2022-01-21 NOTE — LETTER
[facility] A department of [facility]  Phone: [phone]  Fax: [phone]    RUDY Mirza CNP        January 21, 2022     Patient: Luzma العراقي   YOB: 2000   Date of Visit: 1/21/2022       To Whom It May Concern: It is my medical opinion that Vianney Gusman be excused from work 1/18/22-1/23/22 due to covid symptoms. May return to work with no restrictions 1/24/22. If you have any questions or concerns, please don't hesitate to call.     Sincerely,        RUDY Mirza CNP

## 2022-01-24 ENCOUNTER — TELEPHONE (OUTPATIENT)
Dept: FAMILY MEDICINE CLINIC | Age: 22
End: 2022-01-24

## 2022-01-24 NOTE — TELEPHONE ENCOUNTER
----- Message from Maricarmen Kandi sent at 1/24/2022  1:35 PM EST -----  Subject: Message to Provider    QUESTIONS  Information for Provider? Patient parent is calling in to see what can   happen in regards to patient she stated that patient tested positive for   covid two weeks ago but then she stated its like he never got better   ---------------------------------------------------------------------------  --------------  CALL BACK INFO  What is the best way for the office to contact you? OK to leave message on   voicemail  Preferred Call Back Phone Number? [phone]  ---------------------------------------------------------------------------  --------------  SCRIPT ANSWERS  Relationship to Patient? Parent  Representative Name? Garcia Falk  Patient is under 25 and the Parent has custody? Yes  Additional information verified (besides Name and Date of Birth)?  Address

## 2022-01-25 ENCOUNTER — OFFICE VISIT (OUTPATIENT)
Dept: FAMILY MEDICINE CLINIC | Age: 22
End: 2022-01-25
Payer: COMMERCIAL

## 2022-01-25 VITALS
HEIGHT: 70 IN | OXYGEN SATURATION: 97 % | WEIGHT: 235 LBS | RESPIRATION RATE: 16 BRPM | BODY MASS INDEX: 33.64 KG/M2 | TEMPERATURE: 99.1 F | DIASTOLIC BLOOD PRESSURE: 84 MMHG | HEART RATE: 84 BPM | SYSTOLIC BLOOD PRESSURE: 132 MMHG

## 2022-01-25 DIAGNOSIS — G89.29 CHRONIC NONINTRACTABLE HEADACHE, UNSPECIFIED HEADACHE TYPE: ICD-10-CM

## 2022-01-25 DIAGNOSIS — F09 COGNITIVE DISORDER: ICD-10-CM

## 2022-01-25 DIAGNOSIS — U07.1 COVID-19: Primary | ICD-10-CM

## 2022-01-25 DIAGNOSIS — R51.9 CHRONIC NONINTRACTABLE HEADACHE, UNSPECIFIED HEADACHE TYPE: ICD-10-CM

## 2022-01-25 PROCEDURE — 99214 OFFICE O/P EST MOD 30 MIN: CPT | Performed by: FAMILY MEDICINE

## 2022-01-25 RX ORDER — PREDNISONE 20 MG/1
TABLET ORAL
Qty: 15 TABLET | Refills: 0 | Status: SHIPPED | OUTPATIENT
Start: 2022-01-25 | End: 2022-02-23

## 2022-01-25 SDOH — ECONOMIC STABILITY: FOOD INSECURITY: WITHIN THE PAST 12 MONTHS, THE FOOD YOU BOUGHT JUST DIDN'T LAST AND YOU DIDN'T HAVE MONEY TO GET MORE.: NEVER TRUE

## 2022-01-25 SDOH — ECONOMIC STABILITY: FOOD INSECURITY: WITHIN THE PAST 12 MONTHS, YOU WORRIED THAT YOUR FOOD WOULD RUN OUT BEFORE YOU GOT MONEY TO BUY MORE.: NEVER TRUE

## 2022-01-25 ASSESSMENT — PATIENT HEALTH QUESTIONNAIRE - PHQ9
SUM OF ALL RESPONSES TO PHQ QUESTIONS 1-9: 1
SUM OF ALL RESPONSES TO PHQ9 QUESTIONS 1 & 2: 1
SUM OF ALL RESPONSES TO PHQ QUESTIONS 1-9: 1
SUM OF ALL RESPONSES TO PHQ QUESTIONS 1-9: 1
1. LITTLE INTEREST OR PLEASURE IN DOING THINGS: 0
2. FEELING DOWN, DEPRESSED OR HOPELESS: 1
SUM OF ALL RESPONSES TO PHQ QUESTIONS 1-9: 1

## 2022-01-25 ASSESSMENT — ENCOUNTER SYMPTOMS
TROUBLE SWALLOWING: 0
SHORTNESS OF BREATH: 0
VOMITING: 0
SINUS PRESSURE: 0
COUGH: 0
NAUSEA: 0
CONSTIPATION: 0
WHEEZING: 0
DIARRHEA: 0
EYE REDNESS: 0
SORE THROAT: 0
ABDOMINAL PAIN: 0
EYE DISCHARGE: 0
RHINORRHEA: 0

## 2022-01-25 ASSESSMENT — SOCIAL DETERMINANTS OF HEALTH (SDOH): HOW HARD IS IT FOR YOU TO PAY FOR THE VERY BASICS LIKE FOOD, HOUSING, MEDICAL CARE, AND HEATING?: NOT VERY HARD

## 2022-01-25 NOTE — PROGRESS NOTES
2022     Lina Cohn (:  2000) is a 22 y.o. male, here for evaluation of the following medical concerns:    HPI    Patient comes in today secondary to ongoing issues of cognition and headaches. Patient has had cognitive issues since 2018. He just feels like he is very foggy chronically with his thought process. Almost feels like he is \"spacey\" all the time. He has had a known history of headaches as well for quite some time. He did have evidence of glaucoma that was discovered in the last couple years duration and so it is concerning that there could be something intracranial contributing to his overall symptoms. Does note that he was diagnosed recently with Covid 19 and since having acute COVID he feels like his symptoms have even worsened. He states that he does feel like he has no ability to focus or concentrate and feels very lethargic. Has had persistent sinus congestion and drainage and also states his appetite is diminished. Mom is concerned that his symptoms have worsened significantly. Does not feel at this point that he is able to stay on track enough to be able to work. Patient otherwise has no other acute medical concerns at this time. Did review patient's med list, allergies, social history,pmhx and pshx today as noted in the record. Review of Systems   Constitutional: Positive for appetite change and fatigue. Negative for chills and fever. HENT: Negative for congestion, ear pain, postnasal drip, rhinorrhea, sinus pressure, sore throat and trouble swallowing. Eyes: Negative for discharge and redness. Respiratory: Negative for cough, shortness of breath and wheezing. Cardiovascular: Negative for chest pain. Gastrointestinal: Negative for abdominal pain, constipation, diarrhea, nausea and vomiting. Genitourinary: Negative for dysuria, flank pain, frequency and urgency. Musculoskeletal: Negative for arthralgias, myalgias and neck pain.    Skin: Negative for rash and wound. Allergic/Immunologic: Negative for environmental allergies. Neurological: Negative for dizziness, weakness, light-headedness and headaches. Hematological: Negative for adenopathy. Psychiatric/Behavioral: Positive for decreased concentration. Prior to Visit Medications    Medication Sig Taking? Authorizing Provider   acetaminophen (TYLENOL) 500 MG tablet Take 500 mg by mouth every 6 hours as needed for Pain  Historical Provider, MD   albuterol sulfate HFA (VENTOLIN HFA) 108 (90 Base) MCG/ACT inhaler Inhale 2 puffs into the lungs 4 times daily as needed for Wheezing or Shortness of Breath  Judydie Fret, APRN - CNP   Travoprost, BAK Free, (TRAVATAN Z) 0.004 % SOLN ophthalmic solution Place 1 drop into both eyes nightly  Eufemia Duran, OD        Social History     Tobacco Use    Smoking status: Current Some Day Smoker     Packs/day: 0.50     Years: 3.00     Pack years: 1.50     Types: Cigarettes    Smokeless tobacco: Never Used   Substance Use Topics    Alcohol use: No        There were no vitals filed for this visit. Estimated body mass index is 34.67 kg/m² as calculated from the following:    Height as of 10/19/21: 5' 10\" (1.778 m). Weight as of 10/19/21: 241 lb 9.6 oz (109.6 kg). Physical Exam  Vitals and nursing note reviewed. Constitutional:       General: He is not in acute distress. Appearance: Normal appearance. He is well-developed. He is not diaphoretic. HENT:      Head: Normocephalic and atraumatic. Right Ear: Tympanic membrane, ear canal and external ear normal.      Left Ear: Tympanic membrane, ear canal and external ear normal.      Nose: Nose normal.      Mouth/Throat:      Mouth: Mucous membranes are moist.      Pharynx: Oropharynx is clear. No oropharyngeal exudate. Eyes:      General:         Right eye: No discharge. Left eye: No discharge.       Conjunctiva/sclera: Conjunctivae normal.      Pupils: Pupils are equal, round, and reactive to light. Neck:      Thyroid: No thyromegaly. Cardiovascular:      Rate and Rhythm: Normal rate and regular rhythm. Heart sounds: Normal heart sounds. Pulmonary:      Effort: Pulmonary effort is normal.      Breath sounds: Normal breath sounds. No wheezing or rales. Abdominal:      General: Bowel sounds are normal. There is no distension. Palpations: Abdomen is soft. Tenderness: There is no abdominal tenderness. Musculoskeletal:      Cervical back: Normal range of motion and neck supple. Lymphadenopathy:      Cervical: No cervical adenopathy. Skin:     General: Skin is warm and dry. Findings: No rash. Neurological:      Mental Status: He is alert and oriented to person, place, and time. Cranial Nerves: Cranial nerves are intact. Sensory: Sensation is intact. Motor: Motor function is intact. Coordination: Coordination is intact. Gait: Gait is intact. Psychiatric:         Mood and Affect: Affect is flat. Behavior: Behavior normal.         Thought Content: Thought content normal.         Judgment: Judgment normal.         ASSESSMENT/PLAN:    Encounter Diagnoses   Name Primary?  COVID-19 Yes    Chronic nonintractable headache, unspecified headache type     Cognitive disorder      Orders Placed This Encounter   Medications    predniSONE (DELTASONE) 20 MG tablet     Si bid for 5 days, 1 qd for 5 days     Dispense:  15 tablet     Refill:  0     Orders Placed This Encounter   Procedures    MRI BRAIN W WO CONTRAST     Standing Status:   Future     Standing Expiration Date:   2023     Order Specific Question:   Reason for exam:     Answer:   chronic cognitive processing issues with headaches. Glaucoma history     At this point I do think he has some symptoms of Covid long haulers and I do think I will give him a course of oral steroid to see if this will help with acute inflammation.   Did explain to him that this can last anywhere from several days after acute infection to several weeks. Other than getting adequate rest and maintaining hydration there is not really a known treatment for this. Do think that the prednisone will help with any residual respiratory and GI inflammation that could be causing persistent issues. We will get an MRI of the brain due to his chronic cognitive processing issues and headaches as well as glaucoma and visual changes. If this is normal then would pursue neuropsych evaluation for further opinion. Return  if no improvement in symptoms or if any further symptoms arise. (Please note that portions of this note were completed with a voice recognition program. Efforts were made to edit the dictations but occasionally words are mis-transcribed.)      No follow-ups on file. An electronic signature was used to authenticate this note.     --Fatemeh Sun,  on 1/25/2022 at 7:26 AM

## 2022-02-02 ENCOUNTER — HOSPITAL ENCOUNTER (OUTPATIENT)
Dept: MRI IMAGING | Age: 22
Discharge: HOME | End: 2022-02-04

## 2022-02-02 ENCOUNTER — TELEPHONE (OUTPATIENT)
Dept: MRI IMAGING | Age: 22
End: 2022-02-02

## 2022-02-02 DIAGNOSIS — G89.29 CHRONIC NONINTRACTABLE HEADACHE, UNSPECIFIED HEADACHE TYPE: ICD-10-CM

## 2022-02-02 DIAGNOSIS — F09 COGNITIVE DISORDER: ICD-10-CM

## 2022-02-02 DIAGNOSIS — R51.9 CHRONIC NONINTRACTABLE HEADACHE, UNSPECIFIED HEADACHE TYPE: ICD-10-CM

## 2022-02-02 NOTE — TELEPHONE ENCOUNTER
Left patient a voicemail that we could give him some Valium to help relax him for the MRI or we could schedule him an open MRI in [address] [place] [address]. Advised him to call me back with option he would like to try.

## 2022-02-02 NOTE — TELEPHONE ENCOUNTER
Patient returned call and would like to try open MRI in [place]. Advised him we will check with his insurance then [facility] and [facility] will contact him. Verbalizes understanding.

## 2022-02-02 NOTE — TELEPHONE ENCOUNTER
Martha Wray was unable to do his MRI due to claustrophobia. I told him I would let you know and someone from your office would contact him to see what his options are.

## 2022-02-03 ENCOUNTER — TELEPHONE (OUTPATIENT)
Dept: FAMILY MEDICINE CLINIC | Age: 22
End: 2022-02-03

## 2022-02-03 NOTE — TELEPHONE ENCOUNTER
Brian Thomason from [facility], patient brought in [facility] paperwork and she will need a return to work date to pencil in. No follow up appointment made, waiting for patient to be scheduled for open MRI pending insurance review and scheduling at [facility] in [facility]. Please advise on tentative return to work date.

## 2022-02-04 ENCOUNTER — TELEPHONE (OUTPATIENT)
Dept: FAMILY MEDICINE CLINIC | Age: 22
End: 2022-02-04

## 2022-02-04 NOTE — TELEPHONE ENCOUNTER
[facility] regarding the need for PA of open MRI of brain. Patient requesting open MRI at [facility]. Per Letty at [facility] of [address] - NO prior authorization is required for code [address]. Reference # [id number]. Order and insurance information faxed to [facility].

## 2022-02-11 ENCOUNTER — TELEPHONE (OUTPATIENT)
Dept: FAMILY MEDICINE CLINIC | Age: 22
End: 2022-02-11

## 2022-02-11 DIAGNOSIS — F09 COGNITIVE DISORDER: Primary | ICD-10-CM

## 2022-02-11 NOTE — TELEPHONE ENCOUNTER
Patient notified of brain MRI results. Offered neuropsych consult and patient would like to proceed with referral. Referral order placed and information faxed.

## 2022-02-11 NOTE — TELEPHONE ENCOUNTER
we need to notify patient that his MRI did not show any acute concerning findings. Eusebio Olea consider a referral to neuropsych for further assessment of some of his cognitive/attention and focus issues.   --Note from Dr Ne Quijano

## 2022-02-18 ENCOUNTER — TELEPHONE (OUTPATIENT)
Dept: FAMILY MEDICINE CLINIC | Age: 22
End: 2022-02-18

## 2022-02-18 NOTE — TELEPHONE ENCOUNTER
Left message for patient to call back to ask when he is thinking he would be able to return to work?

## 2022-02-18 NOTE — TELEPHONE ENCOUNTER
----- Message from Shen Camacho sent at 2/17/2022  2:45 PM EST -----  Subject: Message to Provider    QUESTIONS  Information for Provider? Pt damien states that FMLA was until last week -   he was not aware of that - he needs a note from Dr extending Hiram Fothergill - has   multiple appts coming up (neuro 4/21 & [facility] issues prior to April) - covid   lingering systems still affecting him; please call pt to advise  ---------------------------------------------------------------------------  --------------  CALL BACK INFO  What is the best way for the office to contact you? OK to leave message on   voicemail  Preferred Call Back Phone Number? [phone]  ---------------------------------------------------------------------------  --------------  SCRIPT ANSWERS  Relationship to Patient?  Self

## 2022-02-18 NOTE — TELEPHONE ENCOUNTER
Patient returning call. States he is unsure of a RTW timeframe. Currently he is able to function about 4-5 hours at a time. He is also trying to coordinate appointments to evaluate issues. Original FMLA was from 1/19/22-2/13/22.

## 2022-02-21 NOTE — TELEPHONE ENCOUNTER
Not sure what to do about his return to work. I don't honestly know that I can keep him off work until his follow up with neurology as this is almost 2 months away and not sure that his symptoms that he is seeing the neurologist for is new onset. What are his current symptoms that he is only able to function for 4-5 hours. Guess I would need more detail about that and probably have to see him back to document what is going on and see if there is anything more that can help him if he is still having symptoms post covid.

## 2022-02-23 ENCOUNTER — OFFICE VISIT (OUTPATIENT)
Dept: FAMILY MEDICINE CLINIC | Age: 22
End: 2022-02-23
Payer: COMMERCIAL

## 2022-02-23 VITALS
SYSTOLIC BLOOD PRESSURE: 130 MMHG | TEMPERATURE: 98.4 F | WEIGHT: 247 LBS | OXYGEN SATURATION: 97 % | BODY MASS INDEX: 35.36 KG/M2 | RESPIRATION RATE: 18 BRPM | HEIGHT: 70 IN | HEART RATE: 104 BPM | DIASTOLIC BLOOD PRESSURE: 80 MMHG

## 2022-02-23 DIAGNOSIS — F41.9 POST-COVID CHRONIC ANXIETY: ICD-10-CM

## 2022-02-23 DIAGNOSIS — U09.9 POST-COVID CHRONIC ANXIETY: ICD-10-CM

## 2022-02-23 DIAGNOSIS — R41.840 POST-COVID CHRONIC CONCENTRATION DEFICIT: Primary | ICD-10-CM

## 2022-02-23 DIAGNOSIS — U09.9 POST-COVID CHRONIC CONCENTRATION DEFICIT: Primary | ICD-10-CM

## 2022-02-23 PROCEDURE — 99213 OFFICE O/P EST LOW 20 MIN: CPT | Performed by: FAMILY MEDICINE

## 2022-02-23 ASSESSMENT — ENCOUNTER SYMPTOMS
GASTROINTESTINAL NEGATIVE: 1
RESPIRATORY NEGATIVE: 1
EYES NEGATIVE: 1

## 2022-02-23 NOTE — PROGRESS NOTES
2022     Santo Escobar (:  2000) is a 22 y.o. male, here for evaluation of the following medical concerns:    HPI  Patient is seen today in follow-up of residual issues related to recent Covid infection. Patient does have continued issues with focus and concentration. Physically he feels fine from his recent Covid infection as he was having a lot of fatigue and weakness but that seems to have improved. He states he just has difficulty staying on track and staying focused. Does still have issues with feeling very anxious. States that he has tried to go out with his friends and just finds that he gets very anxious and needs to go home he is concerned about going back to work as he works in our way he starts feeling that way. Is awaiting neuropsych evaluation to discuss some of his more chronic cognitive issues. Patient otherwise has no other acute medical concerns    Did review patient's med list, allergies, social history, fam history, pmhx and pshx today as noted in the record. Review of Systems   Constitutional: Negative for chills, fatigue and fever. HENT: Negative. Eyes: Negative. Respiratory: Negative. Cardiovascular: Negative. Gastrointestinal: Negative. Psychiatric/Behavioral: Positive for decreased concentration. Negative for agitation, confusion, dysphoric mood and sleep disturbance. The patient is nervous/anxious. Prior to Visit Medications    Medication Sig Taking?  Authorizing Provider   predniSONE (DELTASONE) 20 MG tablet 1 bid for 5 days, 1 qd for 5 days  Sarahi Loyd DO   acetaminophen (TYLENOL) 500 MG tablet Take 500 mg by mouth every 6 hours as needed for Pain  Historical Provider, MD   albuterol sulfate HFA (VENTOLIN HFA) 108 (90 Base) MCG/ACT inhaler Inhale 2 puffs into the lungs 4 times daily as needed for Wheezing or Shortness of Breath  Alice Ulisses, APRN - CNP   Travoprost, BAK Free, (TRAVATAN Z) 0.004 % SOLN ophthalmic solution Place 1 drop into both eyes nightly  Eufemia Coffman Zach, OD        Social History     Tobacco Use    Smoking status: Former Smoker     Packs/day: 0.50     Years: 3.00     Pack years: 1.50     Types: Cigarettes     Start date: 2018     Quit date: 2022     Years since quittin.1    Smokeless tobacco: Never Used   Substance Use Topics    Alcohol use: Yes     Comment: occasionally        There were no vitals filed for this visit. Estimated body mass index is 33.72 kg/m² as calculated from the following:    Height as of 22: 5' 10\" (1.778 m). Weight as of 22: 235 lb (106.6 kg). Physical Exam  Vitals and nursing note reviewed. Constitutional:       General: He is not in acute distress. Appearance: He is well-developed. He is not diaphoretic. HENT:      Head: Normocephalic and atraumatic. Eyes:      Conjunctiva/sclera: Conjunctivae normal.   Pulmonary:      Effort: Pulmonary effort is normal.   Musculoskeletal:      Cervical back: Normal range of motion. Skin:     General: Skin is warm and dry. Coloration: Skin is not pale. Findings: No erythema or rash. Neurological:      Mental Status: He is alert and oriented to person, place, and time. Psychiatric:         Attention and Perception: Attention normal.         Mood and Affect: Mood is anxious. Speech: Speech normal.         Behavior: Behavior normal.         Thought Content: Thought content normal.         Cognition and Memory: Cognition and memory normal.         Judgment: Judgment normal.         ASSESSMENT/PLAN:  Encounter Diagnoses   Name Primary?  Post-COVID chronic concentration deficit Yes    Post-COVID chronic anxiety      At this point I do think that he is having some increased anxiety related to a lot of the stress that happened with being off work from [employer]. Has also noticed the concentration issues. Will give him off work until  and then will give him restricted work week of 40 hours/week. Hopefully he will be able to go back to regular duties without difficulty with maintaining concentration. I do think him getting back into a regular routine would be beneficial for him. Patient is to follow-up with the neuropsychologist as scheduled for further evaluation of his cognitive issues. Patient is to return to my office if continued issues or concerns should arise. (Please note that portions of this note were completed with a voice recognition program. Efforts were made to edit the dictations but occasionally words are mis-transcribed.)        No follow-ups on file. An electronic signature was used to authenticate this note.     --Alpa Mitchell,  on 2/23/2022 at 7:36 AM

## 2022-02-23 NOTE — Clinical Note
[address] of [facility] [place] [address]  Phone: [phone]  Fax: [address] [place],         February 23, 2022     Patient: Mohinder Elena   YOB: 2000   Date of Visit: 2/23/2022       To Whom It May Concern: It is my medical opinion that Ralph Garvin {Work release (duty restriction):31872}. If you have any questions or concerns, please don't hesitate to call.     Sincerely,        Sena Youssef DO

## 2022-02-23 NOTE — LETTER
[address] [facility] of [place] [address]  Phone: [phone]  Fax: Arvin Yu DO      February 23, 2022    Patient:   Mohinder Elena  Date of Birth   2000  Date of visit   2/23/2022        To Whom it May Concern:      Ralph Garvin was seen in my clinic on 2/23/2022. Please excuse from work      If you have any questions or concerns, please don't hesitate to call.       Sincerely,      Sena Youssef DO

## 2022-02-23 NOTE — LETTER
[address] [facility] of [place] [address]  Phone: [phone]  Fax: Arvin Yu,       February 23, 2022    Patient:   Luzma العراقي  Date of Birth   2000  Date of visit   2/23/2022        To Whom it May Concern:      Vianney Gusman was seen in my clinic on 2/23/2022. Please excuse from work 2/13/22-3/6/22 due to acute medical issues. Then he can return on 3/7/22 with restriction of a 40 hour work week. If you have any questions or concerns, please don't hesitate to call.       Sincerely,      Sylvie Ford, DO

## 2022-03-28 ENCOUNTER — OFFICE VISIT (OUTPATIENT)
Dept: PRIMARY CARE CLINIC | Age: 22
End: 2022-03-28
Payer: COMMERCIAL

## 2022-03-28 VITALS
DIASTOLIC BLOOD PRESSURE: 84 MMHG | BODY MASS INDEX: 35.35 KG/M2 | HEART RATE: 106 BPM | OXYGEN SATURATION: 98 % | TEMPERATURE: 98.4 F | WEIGHT: 246.4 LBS | RESPIRATION RATE: 14 BRPM | SYSTOLIC BLOOD PRESSURE: 122 MMHG

## 2022-03-28 DIAGNOSIS — F41.9 ANXIETY: ICD-10-CM

## 2022-03-28 DIAGNOSIS — R41.89 BRAIN FOG: Primary | ICD-10-CM

## 2022-03-28 PROCEDURE — 99212 OFFICE O/P EST SF 10 MIN: CPT | Performed by: NURSE PRACTITIONER

## 2022-03-28 ASSESSMENT — ENCOUNTER SYMPTOMS
CHEST TIGHTNESS: 0
RESPIRATORY NEGATIVE: 1
DIARRHEA: 0
COUGH: 0
WHEEZING: 0
ABDOMINAL PAIN: 0
CONSTIPATION: 0
BACK PAIN: 0
GASTROINTESTINAL NEGATIVE: 1
SHORTNESS OF BREATH: 0

## 2022-03-28 NOTE — PATIENT INSTRUCTIONS
Patient Education        Learning About Generalized Anxiety Disorder  What is generalized anxiety disorder? We all worry. It's a normal part of life. But when you have generalized anxiety disorder, you worry about lots of things and have a hard time stopping your worry. This worry or anxiety interferes with your relationships, work, and life. What causes it? The cause of generalized anxiety disorder is not known. Some studies show that it might be passed down through families. Several things can cause symptoms of anxiety. They include some health problems, some medicines, too much caffeine, and illegal drugs such as cocaine. What are the symptoms? Generalized anxiety disorder can make you feel worried and stressed about many things almost every day. You may have a hard time controlling your worry. Symptoms include:  · Feeling tired or cranky. You may have a hard time concentrating. · Having headaches or muscle aches. · Feeling shaky, sweating, or having hot flashes. · Feeling lightheaded, sick to your stomach, or out of breath. · Feeling like you can't relax. Or being startled easily. · Having problems falling or staying asleep. How is it diagnosed? Your doctor will ask about your health and how often you worry or feel anxious. People with generalized anxiety disorder have more worry and stress than normal. They feel worried and stressed about many things almost every day. And these feelings have lasted for at least 6 months. Your doctor also may ask about other symptoms, like whether you:  · Feel restless. · Feel tired. · Have a hard time thinking or feel that your mind goes blank. · Feel cranky. · Have tense muscles. · Have sleep problems. A physical exam and tests can help make sure that your symptoms aren't caused by a different condition, such as a thyroid problem. How is it treated? Counseling and medicine can both work to treat anxiety.  The two are often used along with lifestyle changes. Cognitive-behavioral therapy (CBT) is a type of counseling that's used to help treat anxiety. In CBT, you learn how to notice and replace thoughts that make you feel worried. It also can help you learn how to relax when you worry. Applied relaxation therapy may also be used. Your counselor might ask you to imagine a calming situation. This can help you relax. Medicines can help. These medicines are often also used for depression. Selective serotonin reuptake inhibitors (SSRIs) are often tried first. But there are other medicines that your doctor may use. You may need to try a few medicines to find one that works well. Many people feel better by getting regular exercise, eating healthy meals, and getting good sleep. Mindfulnessfocusing on things in the present momentalso can help reduce your anxiety. What can you expect when you have it? Having anxiety can be upsetting. Some people might feel less worried and stressed after a couple of months of treatment. But for other people, it might take longer to feel better. Reaching out to people for help is important. Try not to isolate yourself. Let your family and friends help you. Find someone you can trust and confide in. Talk to that person. When you know what anxiety isand how you can get help for ityou can start to learn new ways of thinking. This can help you cope and work through your anxiety. Follow-up care is a key part of your treatment and safety. Be sure to make and go to all appointments, and call your doctor if you are having problems. It's also a good idea to know your test results and keep a list of the medicines you take. Where can you learn more? Go to https://alecia.Azteq Mobile. org and sign in to your [employer] account. Enter G110 in the magnetU box to learn more about \"Learning About Generalized Anxiety Disorder. \"     If you do not have an account, please click on the \"Sign Up Now\" link.   Current as of: June 16, 2021               Content Version: 13.1  © 2006-2021 [employer]. Care instructions adapted under license by [facility] ([facility]). If you have questions about a medical condition or this instruction, always ask your healthcare professional. [address] any warranty or liability for your use of this information. Patient Education        Learning About Chemo Brain  What is chemo brain? Chemo brain is a problem with thinking and memory that can happen during and especially after chemotherapy treatment for cancer. It can make it hard for you to think, concentrate, and do tasks. You may have trouble remembering things. And you may feel like your brain isn't working right. It can be frightening to have this happen, especially during an already stressful time. These problems can be mild. But they can be so serious that people have a hard time working or doing their daily activities. What causes chemo brain? These thinking and memory problems may be caused by chemotherapy medicines used to treat cancer. They could occur because of the cancer itself and maybe because of other medicines used to treat cancer. The anxiety and stress of having cancer also may make it harder to think and remember. What are the symptoms? Symptoms vary depending on the person. But you may:  · Forget events, names, or other things. · Have trouble thinking of certain words when you talk. · Have trouble learning new things. · Take longer to do routine tasks. · Have trouble concentrating or feel like your mind is in a fog. How is chemo brain treated? Chemo brain may go away when treatment ends. If your symptoms are mild, they may go away without treatment. If your symptoms are very bad, your doctor may suggest that you see a specialist who is an expert in thinking and memory problems. What can you do to cope? Take care of yourself  · Try to relax to reduce your stress.  Meditate, or do yoga or another relaxing activity. · Try to be patient with yourself. The problem may go away with time. · Get plenty of sleep. · Eat a healthy diet. · Be as physically active as you can. But check with your doctor to make sure that you don't do too much too soon. · Keep your brain active by reading and doing puzzles, games, or crosswords. Use memory aids  · Use sticky notes and calendars to help you remember events and tasks. · Carry a notebook to write down important dates, to-do lists, and names of people. · Try to have a routine for daily tasks so you get used to doing the same things in the same order every day. · Bring a family member or friend to doctor visits. Or use your phone or another device to record your talk with your doctor. · Keep a diary or journal. Write down when your mind feels the most clear and when you have trouble. Note how much sleep you had, if you were stressed, or other things that happened. Seek support  · Tell your family and close friends about the problem so they know what's going on if you forget words or seem foggy. Suggest ways they can help you. · See an oncology social worker if you are having trouble coping with memory problems. · Think about joining a support group for people in cancer treatment. They may have the same problems. You can share ideas. Follow-up care is a key part of your treatment and safety. Be sure to make and go to all appointments, and call your doctor if you are having problems. It's also a good idea to know your test results and keep a list of the medicines you take. Where can you learn more? Go to https://LightPolehazelBiiCode.Next New Networks. org and sign in to your [employer] account. Enter E865 in the Wink box to learn more about \"Learning About Chemo Brain. \"     If you do not have an account, please click on the \"Sign Up Now\" link.   Current as of: September 8, 2021               Content Version: 13.1  © 4323-2279 [employer]wise,

## 2022-03-28 NOTE — LETTER
[address] [facility] A department of [place] [address]  Phone: [phone]  Fax: [phone]    RUDY Meza CNP        March 28, 2022     Patient: Denis    YOB: 2000   Date of Visit: 3/28/2022       To Whom It May Concern: It is my medical opinion that Zuri Cullen may return to work on 3/29/2022. If you have any questions or concerns, please don't hesitate to call.     Sincerely,        RUDY Meza CNP

## 2022-03-28 NOTE — PROGRESS NOTES
[address] [facility] A department of [facility], [place]  Phone: [phone]  Fax: [phone]      Santo Escobar is a 25 y.o. male who presents to the [facility] today for his medical conditions/complaints as noted below. Santo Escobar is c/o of Other (missed several days of work last week has FMLA at work for [employer]  )          HPI:     Has chronic issues of brain fog and this was exacerbated when he had COVID. He missed work last week on Tuesday and was sent home Friday and missed Saturday and today. Needs work note. Has appointment with neuropsychologist in April. Other  This is a chronic problem. The problem has been waxing and waning. Pertinent negatives include no abdominal pain, chest pain, chills, coughing, fatigue, fever, myalgias, rash or weakness. Nothing (Worse with anxiety) aggravates the symptoms. He has tried nothing (Has tried healthier diet, more sleep and stop smoking.) for the symptoms. The treatment provided no relief. Past Medical History:   Diagnosis Date    Aortic stenosis     congenital     Myopia with astigmatism     Seasonal allergies         No Known Allergies    Wt Readings from Last 3 Encounters:   03/28/22 246 lb 6.4 oz (111.8 kg)   02/23/22 247 lb (112 kg)   01/25/22 235 lb (106.6 kg)     BP Readings from Last 3 Encounters:   03/28/22 122/84   02/23/22 130/80   01/25/22 132/84      Temp Readings from Last 3 Encounters:   03/28/22 98.4 °F (36.9 °C)   02/23/22 98.4 °F (36.9 °C) (Tympanic)   01/25/22 99.1 °F (37.3 °C) (Tympanic)     Pulse Readings from Last 3 Encounters:   03/28/22 106   02/23/22 104   01/25/22 84     SpO2 Readings from Last 3 Encounters:   03/28/22 98%   02/23/22 97%   01/25/22 97%       Subjective:      Review of Systems   Constitutional: Negative. Negative for activity change, appetite change, chills, fatigue and fever. HENT: Negative. Negative for sneezing.     Eyes: Negative for visual disturbance. Respiratory: Negative. Negative for cough, chest tightness, shortness of breath and wheezing. Cardiovascular: Negative. Negative for chest pain, palpitations and leg swelling. Gastrointestinal: Negative. Negative for abdominal pain, constipation and diarrhea. Endocrine: Negative for polydipsia, polyphagia and polyuria. Genitourinary: Negative for difficulty urinating. Musculoskeletal: Negative. Negative for back pain and myalgias. Skin: Negative. Negative for rash. Allergic/Immunologic: Negative for environmental allergies. Neurological: Negative for dizziness, syncope, weakness and light-headedness. Psychiatric/Behavioral: Positive for confusion (Not new symptoms) and decreased concentration (not a new symptoms). Negative for dysphoric mood. All other systems reviewed and are negative. Objective:     Vitals:    03/28/22 1158   BP: 122/84   Pulse: 106   Resp: 14   Temp: 98.4 °F (36.9 °C)   SpO2: 98%   Weight: 246 lb 6.4 oz (111.8 kg)     Body mass index is 35.35 kg/m². /84   Pulse 106   Temp 98.4 °F (36.9 °C)   Resp 14   Wt 246 lb 6.4 oz (111.8 kg)   SpO2 98%   BMI 35.35 kg/m²   Physical Exam  Vitals and nursing note reviewed. Constitutional:       General: He is not in acute distress. Appearance: He is well-developed. HENT:      Nose: Nose normal.      Mouth/Throat:      Mouth: Mucous membranes are moist.   Eyes:      Conjunctiva/sclera: Conjunctivae normal.      Pupils: Pupils are equal, round, and reactive to light. Neck:      Thyroid: No thyromegaly. Cardiovascular:      Rate and Rhythm: Normal rate and regular rhythm. Pulses: Normal pulses. Heart sounds: Normal heart sounds. No murmur heard. Pulmonary:      Effort: Pulmonary effort is normal. No respiratory distress. Breath sounds: Normal breath sounds. No wheezing or rales. Musculoskeletal:         General: Normal range of motion.       Cervical back: Normal range of motion and neck supple. Lymphadenopathy:      Cervical: No cervical adenopathy. Skin:     General: Skin is warm and dry. Capillary Refill: Capillary refill takes less than 2 seconds. Findings: No rash. Neurological:      General: No focal deficit present. Mental Status: He is alert and oriented to person, place, and time. Mental status is at baseline. Psychiatric:         Mood and Affect: Mood normal.         Behavior: Behavior normal.         Judgment: Judgment normal.         Assessment:      Diagnosis Orders   1. Brain fog     2. Anxiety         Plan:     Discussed exam, POCT findings, plan of care (including prescriptive and supportive as listed below) and follow-up at length with patient. Reviewed all prescribed and recommended medications, administration and side effects. Encouraged to return to the clinic for no improvement and or worsening of symptoms. Patient instructed to go to ER or call 911 if any difficulty breathing, shortness of breath, inability to swallow, hives or temp greater than 103 degrees. All questions were answered and they verbalized understanding and were agreeable with the plan. Return if symptoms worsen or fail to improve.         Electronically signed by RUDY Tony CNP on 3/28/2022 at 12:33 PM

## 2022-06-20 ENCOUNTER — TELEPHONE (OUTPATIENT)
Dept: FAMILY MEDICINE CLINIC | Age: 22
End: 2022-06-20

## 2022-06-20 NOTE — TELEPHONE ENCOUNTER
Left message on patients voicemail that he needs to contact his insurance and ask what mental health providers are in his network and then he can call of those providers to schedule directly with, no referral needed.

## 2022-06-20 NOTE — TELEPHONE ENCOUNTER
Pt's girlfriend called in stating that America Bernardo would like a referral to a counselor for depression, anxiety, and disassociation issues.

## 2022-06-27 ENCOUNTER — TELEPHONE (OUTPATIENT)
Dept: FAMILY MEDICINE CLINIC | Age: 22
End: 2022-06-27

## 2022-06-27 RX ORDER — TRAVOPROST OPHTHALMIC SOLUTION 0.04 MG/ML
SOLUTION OPHTHALMIC
Qty: 7.5 ML | Refills: 1 | Status: SHIPPED | OUTPATIENT
Start: 2022-06-27 | End: 2022-07-13 | Stop reason: SDUPTHER

## 2022-06-27 NOTE — TELEPHONE ENCOUNTER
1 bottle with 1 refill sent to pharmacy. Patient needs to make a follow up appointment as he has to cancel his 6 month follow up.

## 2022-06-27 NOTE — TELEPHONE ENCOUNTER
Patient cut his foot and had stitches placed and put on antibiotic. He thinks it does not look correct so would like Dr Kieran Bettencourt to take a look at it. Patient is not available to come in today but would like tomorrow. Patient was scheduled.

## 2022-06-27 NOTE — TELEPHONE ENCOUNTER
----- Message from Davy Shoulders sent at 6/27/2022  8:37 AM EDT -----  Subject: Appointment Request    Reason for Call: Urgent (Patient Request) No Script    QUESTIONS  Type of Appointment? Established Patient  Reason for appointment request? Available appointments did not meet   patient need  Additional Information for Provider? Pt hurt his left foot over the   weekend wanted to discuss apt.   ---------------------------------------------------------------------------  --------------  CALL BACK INFO  What is the best way for the office to contact you? OK to leave message on   voicemail  Preferred Call Back Phone Number? [phone]  ---------------------------------------------------------------------------  --------------  SCRIPT ANSWERS  Relationship to Patient? Self  (Is the patient requesting to see the provider for a procedure?)? No  (Is the patient requesting to see the provider urgently  today or   tomorrow. )? Yes  Have you been diagnosed with COVID-19 in the past 10 days? No  (Service Expert  click yes below to proceed with [employer] As Usual   Scheduling)?  Yes

## 2022-07-08 ENCOUNTER — OFFICE VISIT (OUTPATIENT)
Dept: PRIMARY CARE CLINIC | Age: 22
End: 2022-07-08
Payer: COMMERCIAL

## 2022-07-08 VITALS
TEMPERATURE: 99.4 F | SYSTOLIC BLOOD PRESSURE: 138 MMHG | HEART RATE: 104 BPM | RESPIRATION RATE: 22 BRPM | HEIGHT: 70 IN | BODY MASS INDEX: 34.22 KG/M2 | OXYGEN SATURATION: 97 % | DIASTOLIC BLOOD PRESSURE: 88 MMHG | WEIGHT: 239 LBS

## 2022-07-08 DIAGNOSIS — Z48.02 ENCOUNTER FOR REMOVAL OF SUTURES: Primary | ICD-10-CM

## 2022-07-08 PROCEDURE — 99212 OFFICE O/P EST SF 10 MIN: CPT | Performed by: NURSE PRACTITIONER

## 2022-07-08 NOTE — PROGRESS NOTES
Subjective:      Patient ID: Danyel Roth is a 25 y.o. male coming in for   Chief Complaint   Patient presents with    Suture / Staple Removal     bottom of left foot, into toes, [place]. Placed about 2 weeks ago. Completed ATB      Subjective:      Danyel Roth is a 25 y.o. who obtained a laceration 14 days ago, which required closure with 8 suture (s). he denies pain, redness, or drainage from the wound. Objective:    /88 (Site: Left Upper Arm, Position: Sitting, Cuff Size: Large Adult)   Pulse (!) 104   Temp 99.4 °F (37.4 °C) (Tympanic)   Resp 22   Ht 5' 10\" (1.778 m)   Wt 239 lb (108.4 kg)   SpO2 97%   BMI 34.29 kg/m²   Injury exam:  A 4 cm laceration noted on the left plantar aspect of foot, at base of 1st and 2nd digits is healing well, without evidence of infection. Assessment:      Laceration is healing well, without evidence of infection. Plan:      1. 8 suture (s) were removed. 2. Wound care discussed. 3. Follow-up as needed. 1. Encounter for removal of sutures           Plan:      No orders of the defined types were placed in this encounter. Outpatient Encounter Medications as of 7/8/2022   Medication Sig Dispense Refill    Travoprost, BAK Free, (TRAVATAN Z) 0.004 % SOLN ophthalmic solution PLACE 1 DROP IN BOTH EYES NIGHTLY 7.5 mL 1    acetaminophen (TYLENOL) 500 MG tablet Take 500 mg by mouth every 6 hours as needed for Pain       albuterol sulfate HFA (VENTOLIN HFA) 108 (90 Base) MCG/ACT inhaler Inhale 2 puffs into the lungs 4 times daily as needed for Wheezing or Shortness of Breath (Patient not taking: Reported on 3/28/2022) 18 g 2     No facility-administered encounter medications on file as of 7/8/2022.             RUDY Morgan - CNP

## 2022-07-12 ENCOUNTER — TELEPHONE (OUTPATIENT)
Dept: OPTOMETRY | Age: 22
End: 2022-07-12

## 2022-07-13 RX ORDER — TRAVOPROST OPHTHALMIC SOLUTION 0.04 MG/ML
SOLUTION OPHTHALMIC
Qty: 7.5 ML | Refills: 5 | Status: SHIPPED | OUTPATIENT
Start: 2022-07-13

## 2022-08-01 ENCOUNTER — OFFICE VISIT (OUTPATIENT)
Dept: OPTOMETRY | Age: 22
End: 2022-08-01
Payer: COMMERCIAL

## 2022-08-01 VITALS — SYSTOLIC BLOOD PRESSURE: 116 MMHG | DIASTOLIC BLOOD PRESSURE: 84 MMHG

## 2022-08-01 DIAGNOSIS — H40.1131 PRIMARY OPEN ANGLE GLAUCOMA (POAG) OF BOTH EYES, MILD STAGE: Primary | ICD-10-CM

## 2022-08-01 PROCEDURE — 92133 CPTRZD OPH DX IMG PST SGM ON: CPT | Performed by: OPTOMETRIST

## 2022-08-01 PROCEDURE — 99213 OFFICE O/P EST LOW 20 MIN: CPT | Performed by: OPTOMETRIST

## 2022-08-01 PROCEDURE — 92145 CORNEAL HYSTERESIS DETER: CPT | Performed by: OPTOMETRIST

## 2022-08-01 ASSESSMENT — SLIT LAMP EXAM - LIDS
COMMENTS: NORMAL
COMMENTS: NORMAL

## 2022-08-01 ASSESSMENT — TONOMETRY
OD_IOP_MMHG: 18
OS_IOP_MMHG: 17
IOP_METHOD: TONOPEN

## 2022-08-01 ASSESSMENT — VISUAL ACUITY
METHOD: SNELLEN - LINEAR
CORRECTION_TYPE: GLASSES
OS_CC: 20/20

## 2022-08-01 ASSESSMENT — PACHYMETRY
OS_CT(UM): 570
OD_CT(UM): 565

## 2022-08-01 NOTE — PROGRESS NOTES
Mk Omalley presents today for   Chief Complaint   Patient presents with    Glaucoma   . HPI       Glaucoma              Laterality: both eyes              Comments    6 month IOP check   Travaprost ou nightly   Does not want glasses updated. Left eye feels like its pulling or having a harder time focusing. Current Outpatient Medications   Medication Sig Dispense Refill    Travoprost, BAK Free, (TRAVATAN Z) 0.004 % SOLN ophthalmic solution PLACE 1 DROP IN BOTH EYES NIGHTLY 7.5 mL 5    acetaminophen (TYLENOL) 500 MG tablet Take 500 mg by mouth every 6 hours as needed for Pain       albuterol sulfate HFA (VENTOLIN HFA) 108 (90 Base) MCG/ACT inhaler Inhale 2 puffs into the lungs 4 times daily as needed for Wheezing or Shortness of Breath (Patient not taking: Reported on 3/28/2022) 18 g 2     No current facility-administered medications for this visit.          Family History   Problem Relation Age of Onset    High Blood Pressure Mother     High Cholesterol Mother     Allergies Father     Heart Disease Paternal Grandfather     Heart Disease Maternal Grandmother     Hypertension Maternal Grandmother     Cervical Cancer Maternal Grandmother     High Cholesterol Maternal Grandmother     Glaucoma Neg Hx     Diabetes Neg Hx     Cataracts Neg Hx      Social History     Socioeconomic History    Marital status: Single     Spouse name: None    Number of children: None    Years of education: None    Highest education level: None   Tobacco Use    Smoking status: Former     Packs/day: 0.50     Years: 3.00     Pack years: 1.50     Types: Cigarettes     Start date: 2018     Quit date: 2022     Years since quittin.5    Smokeless tobacco: Never   Vaping Use    Vaping Use: Never used   Substance and Sexual Activity    Alcohol use: Yes     Comment: occasionally    Drug use: No     Social Determinants of Health     Financial Resource Strain: Low Risk     Difficulty of Paying Living Expenses: Not very hard   Food Insecurity: No Food Insecurity    Worried About Running Out of Food in the Last Year: Never true    Ran Out of Food in the Last Year: Never true     Past Medical History:   Diagnosis Date    Aortic stenosis     congenital     Myopia with astigmatism     Seasonal allergies            Main Ophthalmology Exam       External Exam         Right Left    External Normal Normal              Slit Lamp Exam         Right Left    Lids/Lashes Normal Normal    Conjunctiva/Sclera White and quiet White and quiet    Cornea Clear Clear    Anterior Chamber 4+ VH  4+ VH     Iris Round and reactive Round and reactive    Lens Clear Clear    Vitreous Normal Normal              Fundus Exam         Right Left    Disc Normal Normal    C/D Ratio 0.25 0.5    Macula Normal Normal    Vessels Normal Normal                   <div id=\"MAIN_[email or web address]\"></div>      Tonometry       Tonometry (Tonopen, 9:01 AM)         Right Left    Pressure 18 17   Hysteresis: 10.8                      10.5   iop nikita reading high with poor wave scores  25 25                  Not recorded       Not recorded         Visual Acuity (Snellen - Linear)         Right Left    Dist cc 20/20 20/20      Correction: Glasses            Pupils       Pupils         Pupils    Right PERRL    Left PERRL                  Neuro/Psych       Neuro/Psych       Oriented x3: Yes    Mood/Affect: Normal                  Not recorded           Not recorded       Not recorded              Orders Placed This Encounter   Procedures    Posterior Segment Optic Nerve OCT - OU - Both Eyes         IMPRESSION:  1. Primary open angle glaucoma (POAG) of both eyes, mild stage        PLAN:    Please continue travaprost at night in both eyes  glaucoma eyedrops as prescribed. Counseled patient that the glaucomas are a group of eye diseases with the potential for severe and irreversible vision loss. Counseled patient regarding the importance of compliance with therapy and follow-up. Counseled patient regarding glaucoma therapy with topical medications and the potential side effects and adverse effects of topical medications. The patient was counseled that glaucoma is a progressive optic neuropathy that, if left untreated, may progress to irreversible blindness. The patient was counseled that the only demonstrated treatment for glaucoma is to lower the pressure in the eye to a target eye pressure which may decrease the rate at which the progressive optic neuropathy leads to irreversible blindness. There are no Patient Instructions on file for this visit. Return in about 6 months (around 2/1/2023) for vf, oct onh gcc then 1 week dilation complete exam; first available vision exam (left eye pulling)  .

## 2022-10-19 ENCOUNTER — HOSPITAL ENCOUNTER (OUTPATIENT)
Age: 22
Setting detail: SPECIMEN
Discharge: HOME | End: 2022-10-19
Payer: COMMERCIAL

## 2022-10-19 ENCOUNTER — OFFICE VISIT (OUTPATIENT)
Dept: PRIMARY CARE CLINIC | Age: 22
End: 2022-10-19
Payer: COMMERCIAL

## 2022-10-19 VITALS
HEART RATE: 95 BPM | DIASTOLIC BLOOD PRESSURE: 70 MMHG | BODY MASS INDEX: 31.5 KG/M2 | WEIGHT: 220 LBS | TEMPERATURE: 97.9 F | SYSTOLIC BLOOD PRESSURE: 110 MMHG | OXYGEN SATURATION: 98 % | HEIGHT: 70 IN

## 2022-10-19 DIAGNOSIS — J06.9 VIRAL URI: ICD-10-CM

## 2022-10-19 DIAGNOSIS — J06.9 VIRAL URI: Primary | ICD-10-CM

## 2022-10-19 PROCEDURE — U0003 INFECTIOUS AGENT DETECTION BY NUCLEIC ACID (DNA OR RNA); SEVERE ACUTE RESPIRATORY SYNDROME CORONAVIRUS 2 (SARS-COV-2) (CORONAVIRUS DISEASE [COVID-19]), AMPLIFIED PROBE TECHNIQUE, MAKING USE OF HIGH THROUGHPUT TECHNOLOGIES AS DESCRIBED BY CMS-2020-01-R: HCPCS

## 2022-10-19 PROCEDURE — U0005 INFEC AGEN DETEC AMPLI PROBE: HCPCS

## 2022-10-19 PROCEDURE — 99213 OFFICE O/P EST LOW 20 MIN: CPT | Performed by: NURSE PRACTITIONER

## 2022-10-19 ASSESSMENT — ENCOUNTER SYMPTOMS
RHINORRHEA: 1
COUGH: 1
NAUSEA: 0
SORE THROAT: 1

## 2022-10-19 ASSESSMENT — PATIENT HEALTH QUESTIONNAIRE - PHQ9
SUM OF ALL RESPONSES TO PHQ QUESTIONS 1-9: 0
SUM OF ALL RESPONSES TO PHQ QUESTIONS 1-9: 0
SUM OF ALL RESPONSES TO PHQ9 QUESTIONS 1 & 2: 0
2. FEELING DOWN, DEPRESSED OR HOPELESS: 0
SUM OF ALL RESPONSES TO PHQ QUESTIONS 1-9: 0
1. LITTLE INTEREST OR PLEASURE IN DOING THINGS: 0
SUM OF ALL RESPONSES TO PHQ QUESTIONS 1-9: 0

## 2022-10-19 NOTE — PROGRESS NOTES
Subjective:      Patient ID: Dayana Palomares is a 25 y.o. male coming in for   Chief Complaint   Patient presents with    Headache     Congestion,runny nose,body aches. Sx began last night        URI   This is a new problem. The current episode started yesterday. The problem has been unchanged. There has been no fever. Associated symptoms include congestion, coughing, headaches, rhinorrhea and a sore throat. Pertinent negatives include no nausea. He has tried nothing for the symptoms. Review of Systems   Constitutional:  Positive for chills. HENT:  Positive for congestion, rhinorrhea and sore throat. Respiratory:  Positive for cough. Gastrointestinal:  Negative for nausea. Musculoskeletal:  Positive for myalgias. Neurological:  Positive for headaches. All other systems reviewed and are negative. Objective:/70   Pulse 95   Temp 97.9 °F (36.6 °C) (Tympanic)   Ht 5' 10\" (1.778 m)   Wt 220 lb (99.8 kg)   SpO2 98%   BMI 31.57 kg/m²      Physical Exam  Vitals and nursing note reviewed. Constitutional:       General: He is not in acute distress. Appearance: Normal appearance. He is not ill-appearing. HENT:      Head: Normocephalic. Nose: Congestion and rhinorrhea present. Mouth/Throat:      Mouth: Mucous membranes are moist.      Pharynx: Oropharynx is clear. No oropharyngeal exudate or posterior oropharyngeal erythema. Cardiovascular:      Rate and Rhythm: Normal rate and regular rhythm. Heart sounds: Normal heart sounds. Pulmonary:      Effort: Pulmonary effort is normal. No respiratory distress. Breath sounds: Normal breath sounds. No stridor. No wheezing, rhonchi or rales. Musculoskeletal:      Cervical back: Neck supple. Right lower leg: No edema. Left lower leg: No edema. Lymphadenopathy:      Cervical: No cervical adenopathy. Skin:     General: Skin is warm and dry. Findings: No rash.    Neurological:      General: No focal deficit present. Mental Status: He is alert and oriented to person, place, and time. Assessment:      1. Viral URI           Plan:    -symptoms consistent with viral uri  -will check covid pcr  -work note provided  -otc cough/cold meds as needed    Orders Placed This Encounter   Procedures    COVID-19     Standing Status:   Future     Standing Expiration Date:   10/19/2023     Scheduling Instructions:      1) Due to current limited availability of the COVID-19 test, tests will be prioritized based on responses to questions above. Testing may be delayed due to volume. 2) Print and instruct patient to adhere to CDC home isolation program. (Link Above)              3) Set up or refer patient for a monitoring program.              4) Have patient sign up for and leverage [employer] (if not previously done). Order Specific Question:   Is this test for diagnosis or screening? Answer:   Diagnosis of ill patient     Order Specific Question:   Symptomatic for COVID-19 as defined by CDC? Answer:   Yes     Order Specific Question:   Date of Symptom Onset     Answer:   10/17/2022     Order Specific Question:   Hospitalized for COVID-19? Answer:   No     Order Specific Question:   Admitted to ICU for COVID-19? Answer:   No     Order Specific Question:   Employed in healthcare setting? Answer:   Unknown     Order Specific Question:   Resident in a congregate (group) care setting? Answer:   No     Order Specific Question:   Pregnant: Answer:   No     Order Specific Question:   Previously tested for COVID-19?      Answer:   Yes      Outpatient Encounter Medications as of 10/19/2022   Medication Sig Dispense Refill    Travoprost, BAK Free, (TRAVATAN Z) 0.004 % SOLN ophthalmic solution PLACE 1 DROP IN BOTH EYES NIGHTLY 7.5 mL 5    acetaminophen (TYLENOL) 500 MG tablet Take 500 mg by mouth every 6 hours as needed for Pain        No facility-administered encounter medications on file as of 10/19/2022.             Laura Leon, APRN - CNP

## 2022-10-19 NOTE — LETTER
[address] [facility] A department of [place] [address]  Phone: [phone]  Fax: [phone]    Donalda Brittle, APRN - CNP        October 19, 2022     Patient: Tej Beal   YOB: 2000   Date of Visit: 10/19/2022       To Whom It May Concern: It is my medical opinion that Veronica Jones may be excused from work 10/18/22-10/20/22. RTW 10/21/22 with no restrictions. If you have any questions or concerns, please don't hesitate to call.     Sincerely,        Donalda Brittle, APRN - CNP

## 2022-10-20 LAB
SARS-COV-2: NORMAL
SARS-COV-2: NOT DETECTED
SOURCE: NORMAL

## 2022-11-08 ENCOUNTER — OFFICE VISIT (OUTPATIENT)
Dept: PRIMARY CARE CLINIC | Age: 22
End: 2022-11-08
Payer: COMMERCIAL

## 2022-11-08 VITALS
HEART RATE: 62 BPM | DIASTOLIC BLOOD PRESSURE: 80 MMHG | OXYGEN SATURATION: 99 % | BODY MASS INDEX: 30.64 KG/M2 | TEMPERATURE: 98.4 F | HEIGHT: 70 IN | SYSTOLIC BLOOD PRESSURE: 120 MMHG | WEIGHT: 214 LBS

## 2022-11-08 DIAGNOSIS — J01.40 ACUTE NON-RECURRENT PANSINUSITIS: Primary | ICD-10-CM

## 2022-11-08 PROCEDURE — 3074F SYST BP LT 130 MM HG: CPT | Performed by: FAMILY MEDICINE

## 2022-11-08 PROCEDURE — 99214 OFFICE O/P EST MOD 30 MIN: CPT | Performed by: FAMILY MEDICINE

## 2022-11-08 PROCEDURE — 3078F DIAST BP <80 MM HG: CPT | Performed by: FAMILY MEDICINE

## 2022-11-08 PROCEDURE — 87428 SARSCOV & INF VIR A&B AG IA: CPT | Performed by: FAMILY MEDICINE

## 2022-11-08 RX ORDER — AMOXICILLIN 875 MG/1
875 TABLET, COATED ORAL 2 TIMES DAILY
Qty: 20 TABLET | Refills: 0 | Status: SHIPPED | OUTPATIENT
Start: 2022-11-08

## 2022-11-08 ASSESSMENT — ENCOUNTER SYMPTOMS
EYE DISCHARGE: 0
NAUSEA: 0
DIARRHEA: 0
SINUS PRESSURE: 0
SORE THROAT: 1
TROUBLE SWALLOWING: 0
WHEEZING: 0
ABDOMINAL PAIN: 0
SHORTNESS OF BREATH: 0
VOMITING: 0
EYE REDNESS: 0
RHINORRHEA: 0
COUGH: 1
CONSTIPATION: 0

## 2022-11-08 NOTE — PROGRESS NOTES
2022     Davida Higuera (:  2000) is a 25 y.o. male, here for evaluation of the following medical concerns:    URI   This is a new problem. The current episode started in the past 7 days (2 days duration). The problem has been gradually worsening. There has been no fever. Associated symptoms include coughing (dry) and a sore throat. Pertinent negatives include no abdominal pain, chest pain, congestion, diarrhea, dysuria, ear pain, headaches, nausea, neck pain, rash, rhinorrhea, vomiting or wheezing. Associated symptoms comments: Generalized body aches. Did get really dizzy yesterday. Treatments tried: theraflu, nyquil. The treatment provided mild relief. Did review patient's med list, allergies, social history,pmhx and pshx today as noted in the record. Review of Systems   Constitutional:  Negative for chills, fatigue and fever. HENT:  Positive for sore throat. Negative for congestion, ear pain, postnasal drip, rhinorrhea, sinus pressure and trouble swallowing. Eyes:  Negative for discharge and redness. Respiratory:  Positive for cough (dry). Negative for shortness of breath and wheezing. Cardiovascular:  Negative for chest pain. Gastrointestinal:  Negative for abdominal pain, constipation, diarrhea, nausea and vomiting. Genitourinary:  Negative for dysuria, flank pain, frequency and urgency. Musculoskeletal:  Negative for arthralgias, myalgias and neck pain. Skin:  Negative for rash and wound. Allergic/Immunologic: Negative for environmental allergies. Neurological:  Negative for dizziness, weakness, light-headedness and headaches. Hematological:  Negative for adenopathy. Psychiatric/Behavioral: Negative. Prior to Visit Medications    Medication Sig Taking?  Authorizing Provider   Travoprost, KEMAR Free, (TRAVATAN Z) 0.004 % SOLN ophthalmic solution PLACE 1 DROP IN BOTH EYES NIGHTLY Yes Eufemia Dugan, OD   acetaminophen (TYLENOL) 500 MG tablet Take 500 mg by mouth every 6 hours as needed for Pain  Yes Historical Provider, MD        Social History     Tobacco Use    Smoking status: Former     Packs/day: 0.50     Years: 3.00     Pack years: 1.50     Types: Cigarettes     Start date: 2018     Quit date: 2022     Years since quittin.8    Smokeless tobacco: Never   Substance Use Topics    Alcohol use: Yes     Comment: occasionally        Vitals:    22 1140   BP: 120/80   Pulse: 62   Temp: 98.4 °F (36.9 °C)   SpO2: 99%   Weight: 214 lb (97.1 kg)   Height: 5' 10\" (1.778 m)     Estimated body mass index is 30.71 kg/m² as calculated from the following:    Height as of this encounter: 5' 10\" (1.778 m). Weight as of this encounter: 214 lb (97.1 kg). Physical Exam  Vitals and nursing note reviewed. Constitutional:       General: He is not in acute distress. Appearance: Normal appearance. He is well-developed. He is not diaphoretic. HENT:      Head: Normocephalic and atraumatic. Right Ear: External ear normal.      Left Ear: External ear normal.      Ears:      Comments: TMs dull with fluid behind the TM     Nose: Congestion and rhinorrhea present. Comments: Maxillary and frontal sinus tenderness with palpation bilaterally         Mouth/Throat:      Pharynx: No posterior oropharyngeal erythema. Comments: Post nasal drainage noted  Eyes:      General: No scleral icterus. Right eye: No discharge. Left eye: No discharge. Conjunctiva/sclera: Conjunctivae normal.      Pupils: Pupils are equal, round, and reactive to light. Neck:      Thyroid: No thyromegaly. Cardiovascular:      Rate and Rhythm: Normal rate and regular rhythm. Heart sounds: Normal heart sounds. Pulmonary:      Effort: Pulmonary effort is normal. No respiratory distress. Breath sounds: Normal breath sounds. No wheezing. Musculoskeletal:      Cervical back: Normal range of motion and neck supple.    Lymphadenopathy:      Cervical: Cervical adenopathy present. Skin:     General: Skin is warm and dry. Findings: No rash. Neurological:      Mental Status: He is alert and oriented to person, place, and time. Psychiatric:         Behavior: Behavior normal.         Thought Content: Thought content normal.         Judgment: Judgment normal.       ASSESSMENT/PLAN:    Encounter Diagnosis   Name Primary? Acute non-recurrent pansinusitis Yes     Orders Placed This Encounter   Medications    amoxicillin (AMOXIL) 875 MG tablet     Sig: Take 1 tablet by mouth 2 times daily     Dispense:  20 tablet     Refill:  0   Tylenol/Motrin prn    Orders Placed This Encounter   Procedures    POCT COVID-19 & Influenza A/B     Order Specific Question:   Is this test for diagnosis or screening? Answer:   Diagnosis of ill patient     Order Specific Question:   Symptomatic for COVID-19 as defined by CDC? Answer:   Yes     Order Specific Question:   Date of Symptom Onset     Answer:   11/6/2022     Order Specific Question:   Hospitalized for COVID-19? Answer:   No     Order Specific Question:   Admitted to ICU for COVID-19? Answer:   No     Order Specific Question:   Employed in healthcare setting? Answer:   No     Order Specific Question:   Resident in a congregate (group) care setting? Answer:   No     Order Specific Question:   Pregnant: Answer:   No     Order Specific Question:   Previously tested for COVID-19? Answer:   Yes     Rapid covid and influenza A/B testing all negative. Increase fluids and rest    Tylenol/Motrin prn    Return  if no improvement in symptoms or if any further symptoms arise. No follow-ups on file. An electronic signature was used to authenticate this note.     --Ivette Epley, DO on 11/8/2022 at 12:17 PM

## 2022-11-08 NOTE — LETTER
[address] [facility] A department of [facility], [place] [address]  Phone: [phone]  Fax: [phone]        Dania Vazquez, PROVIDER [facility]      November 8, 2022    Patient:   Tenisha Castellon  Date of Birth   2000  Date of visit   11/8/2022        To Whom it May Concern:      Con Flores was seen in my clinic on 11/8/2022. Please excuse from work 11/08/22-11/09/22. May return to work on 11/10/22. If you have any questions or concerns, please don't hesitate to call.       Sincerely,      SCHEDULE, PROVIDER Prudence Benoit

## 2023-06-22 ENCOUNTER — HOSPITAL ENCOUNTER (OUTPATIENT)
Age: 23
Discharge: HOME | End: 2023-06-22
Payer: COMMERCIAL

## 2023-06-22 DIAGNOSIS — Q23.1 BICUSPID AORTIC VALVE: ICD-10-CM

## 2023-06-22 LAB
ALBUMIN SERPL-MCNC: 4.9 G/DL (ref 3.5–5.2)
ALBUMIN/GLOB SERPL: 1.6 {RATIO} (ref 1–2.5)
ALP SERPL-CCNC: 44 U/L (ref 40–129)
ALT SERPL-CCNC: 19 U/L (ref 5–41)
ANION GAP SERPL CALCULATED.3IONS-SCNC: 10 MMOL/L (ref 9–17)
AST SERPL-CCNC: 18 U/L
BILIRUB SERPL-MCNC: 0.9 MG/DL (ref 0.3–1.2)
BUN SERPL-MCNC: 17 MG/DL (ref 6–20)
BUN/CREAT SERPL: 16 (ref 9–20)
CALCIUM SERPL-MCNC: 9.7 MG/DL (ref 8.6–10.4)
CHLORIDE SERPL-SCNC: 103 MMOL/L (ref 98–107)
CHOLEST SERPL-MCNC: 148 MG/DL
CHOLESTEROL/HDL RATIO: 4
CO2 SERPL-SCNC: 28 MMOL/L (ref 20–31)
CREAT SERPL-MCNC: 1.04 MG/DL (ref 0.7–1.2)
GFR SERPL CREATININE-BSD FRML MDRD: >60 ML/MIN/1.73M2
GLUCOSE SERPL-MCNC: 87 MG/DL (ref 70–99)
HDLC SERPL-MCNC: 37 MG/DL
LDLC SERPL CALC-MCNC: 88 MG/DL (ref 0–130)
POTASSIUM SERPL-SCNC: 3.6 MMOL/L (ref 3.7–5.3)
PROT SERPL-MCNC: 7.9 G/DL (ref 6.4–8.3)
SODIUM SERPL-SCNC: 141 MMOL/L (ref 135–144)
TRIGL SERPL-MCNC: 116 MG/DL

## 2023-06-22 PROCEDURE — 36415 COLL VENOUS BLD VENIPUNCTURE: CPT

## 2023-06-22 PROCEDURE — 80053 COMPREHEN METABOLIC PANEL: CPT

## 2023-06-22 PROCEDURE — 80061 LIPID PANEL: CPT

## 2024-03-09 ENCOUNTER — E-VISIT (OUTPATIENT)
Dept: PRIMARY CARE CLINIC | Age: 24
End: 2024-03-09

## 2024-03-09 DIAGNOSIS — M54.9 ACUTE BACK PAIN, UNSPECIFIED BACK LOCATION, UNSPECIFIED BACK PAIN LATERALITY: Primary | ICD-10-CM

## 2024-03-09 NOTE — PROGRESS NOTES
Jhoan Carey (2000) initiated an asynchronous digital communication through [employer].    HPI: per patient questionnaire     Exam: not applicable    Diagnoses and all orders for this visit:  Diagnoses and all orders for this visit:    Acute back pain, unspecified back location, unspecified back pain laterality      Lower back pain. Work note for today. Encourage f/u with pcp     Time: EV3 - 21 or more minutes were spent on the digital evaluation and management of this patient.22 min     Erika Larios, RUDY - CNP

## 2024-11-17 ENCOUNTER — OFFICE VISIT (OUTPATIENT)
Dept: PRIMARY CARE CLINIC | Age: 24
End: 2024-11-17

## 2024-11-17 ENCOUNTER — HOSPITAL ENCOUNTER (EMERGENCY)
Age: 24
Discharge: HOME | End: 2024-11-17
Attending: EMERGENCY MEDICINE
Payer: COMMERCIAL

## 2024-11-17 ENCOUNTER — APPOINTMENT (OUTPATIENT)
Dept: ULTRASOUND IMAGING | Age: 24
End: 2024-11-17
Attending: EMERGENCY MEDICINE
Payer: COMMERCIAL

## 2024-11-17 VITALS
BODY MASS INDEX: 35.79 KG/M2 | DIASTOLIC BLOOD PRESSURE: 84 MMHG | HEIGHT: 70 IN | HEART RATE: 97 BPM | SYSTOLIC BLOOD PRESSURE: 126 MMHG | OXYGEN SATURATION: 97 % | RESPIRATION RATE: 18 BRPM | WEIGHT: 250 LBS | TEMPERATURE: 99.3 F

## 2024-11-17 VITALS
OXYGEN SATURATION: 99 % | HEART RATE: 72 BPM | SYSTOLIC BLOOD PRESSURE: 120 MMHG | BODY MASS INDEX: 36.59 KG/M2 | DIASTOLIC BLOOD PRESSURE: 66 MMHG | TEMPERATURE: 98.2 F | RESPIRATION RATE: 20 BRPM | WEIGHT: 251 LBS

## 2024-11-17 DIAGNOSIS — L30.4 INTERTRIGINOUS DERMATITIS ASSOCIATED WITH MOISTURE: ICD-10-CM

## 2024-11-17 DIAGNOSIS — N49.2 CELLULITIS OF SCROTUM: Primary | ICD-10-CM

## 2024-11-17 DIAGNOSIS — N45.1 EPIDIDYMITIS, RIGHT: ICD-10-CM

## 2024-11-17 DIAGNOSIS — N50.811 PAIN IN RIGHT TESTICLE: Primary | ICD-10-CM

## 2024-11-17 LAB
BILIRUB UR QL STRIP: NEGATIVE
CLARITY UR: CLEAR
COLOR UR: YELLOW
COMMENT: NORMAL
GLUCOSE UR STRIP-MCNC: NEGATIVE MG/DL
HGB UR QL STRIP.AUTO: NEGATIVE
KETONES UR STRIP-MCNC: NEGATIVE MG/DL
LEUKOCYTE ESTERASE UR QL STRIP: NEGATIVE
NITRITE UR QL STRIP: NEGATIVE
PH UR STRIP: 5.5 [PH] (ref 5–6)
PROT UR STRIP-MCNC: NEGATIVE MG/DL
SP GR UR STRIP: 1.02 (ref 1.01–1.02)
UROBILINOGEN UR STRIP-ACNC: NORMAL EU/DL (ref 0–1)

## 2024-11-17 PROCEDURE — 99284 EMERGENCY DEPT VISIT MOD MDM: CPT

## 2024-11-17 PROCEDURE — 81003 URINALYSIS AUTO W/O SCOPE: CPT

## 2024-11-17 PROCEDURE — 76870 US EXAM SCROTUM: CPT

## 2024-11-17 RX ORDER — IBUPROFEN 600 MG/1
600 TABLET, FILM COATED ORAL 3 TIMES DAILY PRN
Qty: 15 TABLET | Refills: 0 | Status: SHIPPED | OUTPATIENT
Start: 2024-11-17

## 2024-11-17 RX ORDER — CEPHALEXIN 500 MG/1
500 CAPSULE ORAL 3 TIMES DAILY
Qty: 30 CAPSULE | Refills: 0 | Status: SHIPPED | OUTPATIENT
Start: 2024-11-17

## 2024-11-17 RX ORDER — DOXYCYCLINE 100 MG/1
100 CAPSULE ORAL 2 TIMES DAILY
Qty: 20 CAPSULE | Refills: 0 | Status: SHIPPED | OUTPATIENT
Start: 2024-11-17 | End: 2024-11-27

## 2024-11-17 RX ORDER — CLOTRIMAZOLE AND BETAMETHASONE DIPROPIONATE 10; .64 MG/G; MG/G
CREAM TOPICAL
Qty: 45 G | Refills: 0 | Status: SHIPPED | OUTPATIENT
Start: 2024-11-17

## 2024-11-17 ASSESSMENT — PAIN - FUNCTIONAL ASSESSMENT: PAIN_FUNCTIONAL_ASSESSMENT: 0-10

## 2024-11-17 ASSESSMENT — LIFESTYLE VARIABLES
HOW OFTEN DO YOU HAVE A DRINK CONTAINING ALCOHOL: NEVER
HOW MANY STANDARD DRINKS CONTAINING ALCOHOL DO YOU HAVE ON A TYPICAL DAY: PATIENT DOES NOT DRINK

## 2024-11-17 ASSESSMENT — PAIN DESCRIPTION - ONSET: ONSET: SUDDEN

## 2024-11-17 ASSESSMENT — PAIN DESCRIPTION - FREQUENCY: FREQUENCY: CONTINUOUS

## 2024-11-17 ASSESSMENT — PAIN SCALES - GENERAL: PAINLEVEL_OUTOF10: 2

## 2024-11-17 ASSESSMENT — PAIN DESCRIPTION - PAIN TYPE: TYPE: ACUTE PAIN

## 2024-11-17 NOTE — ED PROVIDER NOTES
[facility] ED  EMERGENCY DEPARTMENT ENCOUNTER      Pt Name: Jhoan Carey  MRN: [id number]  Birthdate 2000  Date of evaluation: 11/17/2024  Provider: Raymon Diaz MD    CHIEF COMPLAINT     Chief Complaint   Patient presents with    Groin Swelling     Started last night then better but came back worse.           HISTORY OF PRESENT ILLNESS   (Location/Symptom, Timing/Onset, Context/Setting,Quality, Duration, Modifying Factors, Severity)  Note limiting factors.   Jhoan Carey is a 24 y.o. male who presents to the emergency department with a chief complaint of right testicular pain that he noticed last night.  Pain is worse with any kind of movement but at rest it is fairly well-controlled.  He denies urethral discharge or urinary symptoms.    The history is provided by the patient.       Nursing Notes werereviewed.    REVIEW OF SYSTEMS    (2-9 systems for level 4, 10 or more for level 5)     Review of Systems   All other systems reviewed and are negative.      Except as noted above the remainder of the review of systems was reviewed and negative.       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    FAMILY HISTORY       Family History   Problem Relation Age 
165.1

## 2024-11-19 ENCOUNTER — TELEPHONE (OUTPATIENT)
Dept: FAMILY MEDICINE CLINIC | Age: 24
End: 2024-11-19

## 2024-11-19 NOTE — TELEPHONE ENCOUNTER
Scheduled patient tomorrow 11/20/2024 with Dr Golden for ER fup and to look over [facility] paperwork

## 2024-11-19 NOTE — TELEPHONE ENCOUNTER
Pt calling back in regards to his appt request, pt states he really just needs FMLA paperwork filled out today or he will lose his job, please advise pt as to what we can do.

## 2024-11-20 ENCOUNTER — TELEPHONE (OUTPATIENT)
Dept: FAMILY MEDICINE CLINIC | Age: 24
End: 2024-11-20

## 2024-11-20 ENCOUNTER — OFFICE VISIT (OUTPATIENT)
Dept: FAMILY MEDICINE CLINIC | Age: 24
End: 2024-11-20
Payer: COMMERCIAL

## 2024-11-20 VITALS
SYSTOLIC BLOOD PRESSURE: 118 MMHG | WEIGHT: 257 LBS | HEIGHT: 70 IN | DIASTOLIC BLOOD PRESSURE: 74 MMHG | HEART RATE: 96 BPM | BODY MASS INDEX: 36.79 KG/M2 | OXYGEN SATURATION: 97 %

## 2024-11-20 DIAGNOSIS — N50.811 RIGHT TESTICULAR PAIN: Primary | ICD-10-CM

## 2024-11-20 PROCEDURE — 3074F SYST BP LT 130 MM HG: CPT | Performed by: FAMILY MEDICINE

## 2024-11-20 PROCEDURE — 99212 OFFICE O/P EST SF 10 MIN: CPT | Performed by: FAMILY MEDICINE

## 2024-11-20 PROCEDURE — 99213 OFFICE O/P EST LOW 20 MIN: CPT | Performed by: FAMILY MEDICINE

## 2024-11-20 PROCEDURE — 3078F DIAST BP <80 MM HG: CPT | Performed by: FAMILY MEDICINE

## 2024-11-20 SDOH — ECONOMIC STABILITY: FOOD INSECURITY: WITHIN THE PAST 12 MONTHS, THE FOOD YOU BOUGHT JUST DIDN'T LAST AND YOU DIDN'T HAVE MONEY TO GET MORE.: NEVER TRUE

## 2024-11-20 SDOH — ECONOMIC STABILITY: FOOD INSECURITY: WITHIN THE PAST 12 MONTHS, YOU WORRIED THAT YOUR FOOD WOULD RUN OUT BEFORE YOU GOT MONEY TO BUY MORE.: NEVER TRUE

## 2024-11-20 SDOH — ECONOMIC STABILITY: INCOME INSECURITY: HOW HARD IS IT FOR YOU TO PAY FOR THE VERY BASICS LIKE FOOD, HOUSING, MEDICAL CARE, AND HEATING?: NOT HARD AT ALL

## 2024-11-20 ASSESSMENT — ENCOUNTER SYMPTOMS
RESPIRATORY NEGATIVE: 1
EYES NEGATIVE: 1
GASTROINTESTINAL NEGATIVE: 1

## 2024-11-20 ASSESSMENT — PATIENT HEALTH QUESTIONNAIRE - PHQ9
SUM OF ALL RESPONSES TO PHQ QUESTIONS 1-9: 0
1. LITTLE INTEREST OR PLEASURE IN DOING THINGS: NOT AT ALL
SUM OF ALL RESPONSES TO PHQ9 QUESTIONS 1 & 2: 0
2. FEELING DOWN, DEPRESSED OR HOPELESS: NOT AT ALL
SUM OF ALL RESPONSES TO PHQ QUESTIONS 1-9: 0

## 2024-11-20 NOTE — TELEPHONE ENCOUNTER
Received [place] papers today from patient. Seeing [place] today for episode. Forms logged and copied. Forms given to nurse.